# Patient Record
Sex: FEMALE | Race: WHITE | NOT HISPANIC OR LATINO
[De-identification: names, ages, dates, MRNs, and addresses within clinical notes are randomized per-mention and may not be internally consistent; named-entity substitution may affect disease eponyms.]

---

## 2017-05-23 ENCOUNTER — FORM ENCOUNTER (OUTPATIENT)
Age: 52
End: 2017-05-23

## 2017-05-24 ENCOUNTER — APPOINTMENT (OUTPATIENT)
Dept: SPINE | Facility: CLINIC | Age: 52
End: 2017-05-24

## 2017-05-24 ENCOUNTER — OUTPATIENT (OUTPATIENT)
Dept: OUTPATIENT SERVICES | Facility: HOSPITAL | Age: 52
LOS: 1 days | End: 2017-05-24
Payer: COMMERCIAL

## 2017-05-24 VITALS
HEIGHT: 69 IN | DIASTOLIC BLOOD PRESSURE: 67 MMHG | SYSTOLIC BLOOD PRESSURE: 105 MMHG | WEIGHT: 160 LBS | HEART RATE: 62 BPM | BODY MASS INDEX: 23.7 KG/M2 | OXYGEN SATURATION: 99 %

## 2017-05-24 PROCEDURE — 72110 X-RAY EXAM L-2 SPINE 4/>VWS: CPT

## 2017-05-24 PROCEDURE — 72082 X-RAY EXAM ENTIRE SPI 2/3 VW: CPT | Mod: 26

## 2017-05-24 PROCEDURE — 72082 X-RAY EXAM ENTIRE SPI 2/3 VW: CPT

## 2017-07-11 ENCOUNTER — FORM ENCOUNTER (OUTPATIENT)
Age: 52
End: 2017-07-11

## 2017-07-12 ENCOUNTER — OUTPATIENT (OUTPATIENT)
Dept: OUTPATIENT SERVICES | Facility: HOSPITAL | Age: 52
LOS: 1 days | End: 2017-07-12
Payer: COMMERCIAL

## 2017-07-12 ENCOUNTER — APPOINTMENT (OUTPATIENT)
Dept: SPINE | Facility: CLINIC | Age: 52
End: 2017-07-12

## 2017-07-12 VITALS
HEIGHT: 69 IN | BODY MASS INDEX: 23.7 KG/M2 | HEART RATE: 64 BPM | SYSTOLIC BLOOD PRESSURE: 100 MMHG | WEIGHT: 160 LBS | OXYGEN SATURATION: 97 % | DIASTOLIC BLOOD PRESSURE: 60 MMHG

## 2017-07-12 PROCEDURE — 72131 CT LUMBAR SPINE W/O DYE: CPT

## 2017-07-12 PROCEDURE — 72131 CT LUMBAR SPINE W/O DYE: CPT | Mod: 26

## 2017-07-24 ENCOUNTER — RX RENEWAL (OUTPATIENT)
Age: 52
End: 2017-07-24

## 2017-07-24 ENCOUNTER — APPOINTMENT (OUTPATIENT)
Dept: SPINE | Facility: CLINIC | Age: 52
End: 2017-07-24

## 2017-07-24 VITALS
HEART RATE: 72 BPM | DIASTOLIC BLOOD PRESSURE: 64 MMHG | HEIGHT: 69 IN | SYSTOLIC BLOOD PRESSURE: 117 MMHG | BODY MASS INDEX: 23.7 KG/M2 | WEIGHT: 160 LBS

## 2017-07-24 RX ORDER — CHLORHEXIDINE GLUCONATE, 0.12% ORAL RINSE 1.2 MG/ML
0.12 SOLUTION DENTAL
Qty: 473 | Refills: 0 | Status: DISCONTINUED | COMMUNITY
Start: 2017-02-16 | End: 2017-07-24

## 2017-08-15 ENCOUNTER — OUTPATIENT (OUTPATIENT)
Dept: OUTPATIENT SERVICES | Facility: HOSPITAL | Age: 52
LOS: 1 days | End: 2017-08-15
Payer: COMMERCIAL

## 2017-08-15 VITALS
RESPIRATION RATE: 16 BRPM | DIASTOLIC BLOOD PRESSURE: 66 MMHG | WEIGHT: 167.99 LBS | OXYGEN SATURATION: 100 % | HEIGHT: 69 IN | HEART RATE: 68 BPM | SYSTOLIC BLOOD PRESSURE: 106 MMHG | TEMPERATURE: 98 F

## 2017-08-15 DIAGNOSIS — Z01.818 ENCOUNTER FOR OTHER PREPROCEDURAL EXAMINATION: ICD-10-CM

## 2017-08-15 DIAGNOSIS — M51.26 OTHER INTERVERTEBRAL DISC DISPLACEMENT, LUMBAR REGION: ICD-10-CM

## 2017-08-15 LAB
BLD GP AB SCN SERPL QL: NEGATIVE — SIGNIFICANT CHANGE UP
HCT VFR BLD CALC: 41.5 % — SIGNIFICANT CHANGE UP (ref 34.5–45)
HGB BLD-MCNC: 13.6 G/DL — SIGNIFICANT CHANGE UP (ref 11.5–15.5)
MCHC RBC-ENTMCNC: 30.5 PG — SIGNIFICANT CHANGE UP (ref 27–34)
MCHC RBC-ENTMCNC: 32.8 GM/DL — SIGNIFICANT CHANGE UP (ref 32–36)
MCV RBC AUTO: 93 FL — SIGNIFICANT CHANGE UP (ref 80–100)
PLATELET # BLD AUTO: 323 K/UL — SIGNIFICANT CHANGE UP (ref 150–400)
RBC # BLD: 4.46 M/UL — SIGNIFICANT CHANGE UP (ref 3.8–5.2)
RBC # FLD: 13.4 % — SIGNIFICANT CHANGE UP (ref 10.3–14.5)
RH IG SCN BLD-IMP: POSITIVE — SIGNIFICANT CHANGE UP
WBC # BLD: 4.56 K/UL — SIGNIFICANT CHANGE UP (ref 3.8–10.5)
WBC # FLD AUTO: 4.56 K/UL — SIGNIFICANT CHANGE UP (ref 3.8–10.5)

## 2017-08-15 PROCEDURE — 86850 RBC ANTIBODY SCREEN: CPT

## 2017-08-15 PROCEDURE — G0463: CPT

## 2017-08-15 PROCEDURE — 86901 BLOOD TYPING SEROLOGIC RH(D): CPT

## 2017-08-15 PROCEDURE — 85027 COMPLETE CBC AUTOMATED: CPT

## 2017-08-15 PROCEDURE — 86900 BLOOD TYPING SEROLOGIC ABO: CPT

## 2017-08-15 NOTE — H&P PST ADULT - HISTORY OF PRESENT ILLNESS
52 y.o. female c/o worsening low back pain for over 2 years. Her pain radiates down her Right leg to the Right heel with paresthesias. She underwent an MRI and a CT scan of lower back which revealed L5-S1 spondylolisthesis with significant foraminal stenosis. Patient is scheduled for L5-S1 Posterior Lumbar Fusion.

## 2017-08-15 NOTE — H&P PST ADULT - NSANTHOSAYNRD_GEN_A_CORE
No. VITOR screening performed.  STOP BANG Legend: 0-2 = LOW Risk; 3-4 = INTERMEDIATE Risk; 5-8 = HIGH Risk

## 2017-08-15 NOTE — H&P PST ADULT - ACTIVITY
light work around the house, can walk a block on level ground, activities limited by severe back pain

## 2017-10-13 ENCOUNTER — OUTPATIENT (OUTPATIENT)
Dept: OUTPATIENT SERVICES | Facility: HOSPITAL | Age: 52
LOS: 1 days | End: 2017-10-13
Payer: COMMERCIAL

## 2017-10-13 DIAGNOSIS — M54.17 RADICULOPATHY, LUMBOSACRAL REGION: ICD-10-CM

## 2017-10-13 PROCEDURE — 77003 FLUOROGUIDE FOR SPINE INJECT: CPT

## 2017-10-13 PROCEDURE — 64484 NJX AA&/STRD TFRM EPI L/S EA: CPT | Mod: RT

## 2017-10-13 PROCEDURE — 64483 NJX AA&/STRD TFRM EPI L/S 1: CPT | Mod: 50

## 2018-03-15 ENCOUNTER — APPOINTMENT (OUTPATIENT)
Dept: SPINE | Facility: CLINIC | Age: 53
End: 2018-03-15

## 2018-04-19 ENCOUNTER — APPOINTMENT (OUTPATIENT)
Dept: SPINE | Facility: CLINIC | Age: 53
End: 2018-04-19
Payer: COMMERCIAL

## 2018-04-19 VITALS
HEIGHT: 69 IN | SYSTOLIC BLOOD PRESSURE: 100 MMHG | BODY MASS INDEX: 22.22 KG/M2 | DIASTOLIC BLOOD PRESSURE: 60 MMHG | WEIGHT: 150 LBS

## 2018-04-19 DIAGNOSIS — Z00.00 ENCOUNTER FOR GENERAL ADULT MEDICAL EXAMINATION W/OUT ABNORMAL FINDINGS: ICD-10-CM

## 2018-04-19 PROCEDURE — 99213 OFFICE O/P EST LOW 20 MIN: CPT

## 2018-04-19 RX ORDER — TRAMADOL HYDROCHLORIDE 50 MG/1
50 TABLET, COATED ORAL EVERY 8 HOURS
Qty: 42 | Refills: 0 | Status: DISCONTINUED | COMMUNITY
Start: 2017-07-24 | End: 2018-04-19

## 2018-04-19 RX ORDER — IBUPROFEN 800 MG/1
800 TABLET, FILM COATED ORAL
Refills: 0 | Status: DISCONTINUED | COMMUNITY
End: 2018-04-19

## 2018-04-19 RX ORDER — METHYLPREDNISOLONE 4 MG/1
4 TABLET ORAL
Qty: 21 | Refills: 0 | Status: DISCONTINUED | COMMUNITY
Start: 2017-04-18 | End: 2018-04-19

## 2018-04-19 RX ORDER — TRAMADOL HYDROCHLORIDE 50 MG/1
50 TABLET, COATED ORAL
Qty: 42 | Refills: 0 | Status: DISCONTINUED | COMMUNITY
Start: 2017-07-24 | End: 2018-04-19

## 2018-04-21 ENCOUNTER — APPOINTMENT (OUTPATIENT)
Dept: MRI IMAGING | Facility: CLINIC | Age: 53
End: 2018-04-21

## 2018-05-29 ENCOUNTER — APPOINTMENT (OUTPATIENT)
Dept: MRI IMAGING | Facility: CLINIC | Age: 53
End: 2018-05-29
Payer: COMMERCIAL

## 2018-05-29 ENCOUNTER — APPOINTMENT (OUTPATIENT)
Dept: CT IMAGING | Facility: CLINIC | Age: 53
End: 2018-05-29
Payer: COMMERCIAL

## 2018-05-29 ENCOUNTER — APPOINTMENT (OUTPATIENT)
Dept: RADIOLOGY | Facility: CLINIC | Age: 53
End: 2018-05-29
Payer: COMMERCIAL

## 2018-05-29 ENCOUNTER — OUTPATIENT (OUTPATIENT)
Dept: OUTPATIENT SERVICES | Facility: HOSPITAL | Age: 53
LOS: 1 days | End: 2018-05-29
Payer: COMMERCIAL

## 2018-05-29 DIAGNOSIS — M51.26 OTHER INTERVERTEBRAL DISC DISPLACEMENT, LUMBAR REGION: ICD-10-CM

## 2018-05-29 DIAGNOSIS — M54.16 RADICULOPATHY, LUMBAR REGION: ICD-10-CM

## 2018-05-29 PROCEDURE — 72148 MRI LUMBAR SPINE W/O DYE: CPT | Mod: 26

## 2018-05-29 PROCEDURE — 72131 CT LUMBAR SPINE W/O DYE: CPT | Mod: 26

## 2018-05-29 PROCEDURE — 72148 MRI LUMBAR SPINE W/O DYE: CPT

## 2018-05-29 PROCEDURE — 72131 CT LUMBAR SPINE W/O DYE: CPT

## 2018-06-27 ENCOUNTER — OUTPATIENT (OUTPATIENT)
Dept: OUTPATIENT SERVICES | Facility: HOSPITAL | Age: 53
LOS: 1 days | End: 2018-06-27
Payer: COMMERCIAL

## 2018-06-27 VITALS
HEART RATE: 69 BPM | WEIGHT: 166.01 LBS | OXYGEN SATURATION: 100 % | DIASTOLIC BLOOD PRESSURE: 79 MMHG | RESPIRATION RATE: 18 BRPM | SYSTOLIC BLOOD PRESSURE: 118 MMHG | HEIGHT: 69 IN | TEMPERATURE: 98 F

## 2018-06-27 DIAGNOSIS — Z01.818 ENCOUNTER FOR OTHER PREPROCEDURAL EXAMINATION: ICD-10-CM

## 2018-06-27 DIAGNOSIS — M51.26 OTHER INTERVERTEBRAL DISC DISPLACEMENT, LUMBAR REGION: ICD-10-CM

## 2018-06-27 DIAGNOSIS — Z90.49 ACQUIRED ABSENCE OF OTHER SPECIFIED PARTS OF DIGESTIVE TRACT: Chronic | ICD-10-CM

## 2018-06-27 LAB
ALBUMIN SERPL ELPH-MCNC: 4.3 G/DL — SIGNIFICANT CHANGE UP (ref 3.3–5)
ALP SERPL-CCNC: 70 U/L — SIGNIFICANT CHANGE UP (ref 40–120)
ALT FLD-CCNC: 14 U/L — SIGNIFICANT CHANGE UP (ref 10–45)
ANION GAP SERPL CALC-SCNC: 11 MMOL/L — SIGNIFICANT CHANGE UP (ref 5–17)
AST SERPL-CCNC: 14 U/L — SIGNIFICANT CHANGE UP (ref 10–40)
BILIRUB SERPL-MCNC: 0.2 MG/DL — SIGNIFICANT CHANGE UP (ref 0.2–1.2)
BLD GP AB SCN SERPL QL: NEGATIVE — SIGNIFICANT CHANGE UP
BUN SERPL-MCNC: 12 MG/DL — SIGNIFICANT CHANGE UP (ref 7–23)
CALCIUM SERPL-MCNC: 9.5 MG/DL — SIGNIFICANT CHANGE UP (ref 8.4–10.5)
CHLORIDE SERPL-SCNC: 106 MMOL/L — SIGNIFICANT CHANGE UP (ref 96–108)
CO2 SERPL-SCNC: 25 MMOL/L — SIGNIFICANT CHANGE UP (ref 22–31)
CREAT SERPL-MCNC: 0.65 MG/DL — SIGNIFICANT CHANGE UP (ref 0.5–1.3)
GLUCOSE SERPL-MCNC: 74 MG/DL — SIGNIFICANT CHANGE UP (ref 70–99)
HCT VFR BLD CALC: 42 % — SIGNIFICANT CHANGE UP (ref 34.5–45)
HGB BLD-MCNC: 13.8 G/DL — SIGNIFICANT CHANGE UP (ref 11.5–15.5)
MCHC RBC-ENTMCNC: 30.3 PG — SIGNIFICANT CHANGE UP (ref 27–34)
MCHC RBC-ENTMCNC: 32.9 GM/DL — SIGNIFICANT CHANGE UP (ref 32–36)
MCV RBC AUTO: 92.1 FL — SIGNIFICANT CHANGE UP (ref 80–100)
MRSA PCR RESULT.: SIGNIFICANT CHANGE UP
PLATELET # BLD AUTO: 306 K/UL — SIGNIFICANT CHANGE UP (ref 150–400)
POTASSIUM SERPL-MCNC: 4.3 MMOL/L — SIGNIFICANT CHANGE UP (ref 3.5–5.3)
POTASSIUM SERPL-SCNC: 4.3 MMOL/L — SIGNIFICANT CHANGE UP (ref 3.5–5.3)
PROT SERPL-MCNC: 7.1 G/DL — SIGNIFICANT CHANGE UP (ref 6–8.3)
RBC # BLD: 4.56 M/UL — SIGNIFICANT CHANGE UP (ref 3.8–5.2)
RBC # FLD: 14.7 % — HIGH (ref 10.3–14.5)
RH IG SCN BLD-IMP: POSITIVE — SIGNIFICANT CHANGE UP
S AUREUS DNA NOSE QL NAA+PROBE: SIGNIFICANT CHANGE UP
SODIUM SERPL-SCNC: 142 MMOL/L — SIGNIFICANT CHANGE UP (ref 135–145)
WBC # BLD: 4.98 K/UL — SIGNIFICANT CHANGE UP (ref 3.8–10.5)
WBC # FLD AUTO: 4.98 K/UL — SIGNIFICANT CHANGE UP (ref 3.8–10.5)

## 2018-06-27 PROCEDURE — 86900 BLOOD TYPING SEROLOGIC ABO: CPT

## 2018-06-27 PROCEDURE — 85027 COMPLETE CBC AUTOMATED: CPT

## 2018-06-27 PROCEDURE — 80053 COMPREHEN METABOLIC PANEL: CPT

## 2018-06-27 PROCEDURE — 86901 BLOOD TYPING SEROLOGIC RH(D): CPT

## 2018-06-27 PROCEDURE — 86850 RBC ANTIBODY SCREEN: CPT

## 2018-06-27 PROCEDURE — 87641 MR-STAPH DNA AMP PROBE: CPT

## 2018-06-27 PROCEDURE — 87640 STAPH A DNA AMP PROBE: CPT

## 2018-06-27 PROCEDURE — G0463: CPT

## 2018-06-27 RX ORDER — IBUPROFEN 200 MG
1 TABLET ORAL
Qty: 0 | Refills: 0 | COMMUNITY

## 2018-06-27 RX ORDER — CEFAZOLIN SODIUM 1 G
2000 VIAL (EA) INJECTION ONCE
Qty: 0 | Refills: 0 | Status: DISCONTINUED | OUTPATIENT
Start: 2018-07-06 | End: 2018-07-07

## 2018-06-27 RX ORDER — LIDOCAINE HCL 20 MG/ML
0.2 VIAL (ML) INJECTION ONCE
Qty: 0 | Refills: 0 | Status: DISCONTINUED | OUTPATIENT
Start: 2018-07-06 | End: 2018-07-06

## 2018-06-27 RX ORDER — SODIUM CHLORIDE 9 MG/ML
3 INJECTION INTRAMUSCULAR; INTRAVENOUS; SUBCUTANEOUS EVERY 8 HOURS
Qty: 0 | Refills: 0 | Status: DISCONTINUED | OUTPATIENT
Start: 2018-07-06 | End: 2018-07-06

## 2018-06-27 NOTE — H&P PST ADULT - PMH
Appendicitis    Herniated lumbar intervertebral disc    Ileus following gastrointestinal surgery    Lumbar radiculopathy    Pelvic abscess in female

## 2018-06-27 NOTE — H&P PST ADULT - HISTORY OF PRESENT ILLNESS
54 y/o F PMH appendicitis, S/P appendectomy 4/2018 at [facility], c/b E Coli+_pelvic abscess that was drained and ileus post-op with NGT placement, was admitted for 20 days, presents today with c/o low back pain for several years that radiates into the right lower leg, S/P epidural injections, last one a year ago without relief.  Scheduled for L5-S12 Metrx posterior lumbar fusion. 54 y/o F PMH appendicitis, S/P appendectomy 4/2018 at [facility], c/b E Coli+ pelvic abscess that was drained and ileus post-op with NGT placement, was admitted for 20 days, presents today with c/o low back pain for several years that radiates into the right lower leg, S/P epidural injections, last one a year ago without relief.  Scheduled for L5-S12 Metrx posterior lumbar fusion.

## 2018-06-27 NOTE — H&P PST ADULT - ATTENDING COMMENTS
agree with note agree with note  Pt. had allergy testing  and is allergic to copper and cobalt.   She is not allergic to titanium which will be utilized in her fusion today. agree with note  Pt. had allergy testing for metals and is allergic to copper and cobalt among other things(see allergy section).   She is not allergic to titanium which will be utilized in her fusion today.

## 2018-07-02 ENCOUNTER — OUTPATIENT (OUTPATIENT)
Dept: OUTPATIENT SERVICES | Facility: HOSPITAL | Age: 53
LOS: 1 days | End: 2018-07-02
Payer: COMMERCIAL

## 2018-07-02 ENCOUNTER — APPOINTMENT (OUTPATIENT)
Dept: RADIOLOGY | Facility: CLINIC | Age: 53
End: 2018-07-02
Payer: COMMERCIAL

## 2018-07-02 DIAGNOSIS — Z90.49 ACQUIRED ABSENCE OF OTHER SPECIFIED PARTS OF DIGESTIVE TRACT: Chronic | ICD-10-CM

## 2018-07-02 DIAGNOSIS — M51.26 OTHER INTERVERTEBRAL DISC DISPLACEMENT, LUMBAR REGION: ICD-10-CM

## 2018-07-02 PROCEDURE — 72082 X-RAY EXAM ENTIRE SPI 2/3 VW: CPT | Mod: 26

## 2018-07-02 PROCEDURE — 72082 X-RAY EXAM ENTIRE SPI 2/3 VW: CPT

## 2018-07-05 ENCOUNTER — TRANSCRIPTION ENCOUNTER (OUTPATIENT)
Age: 53
End: 2018-07-05

## 2018-07-05 ENCOUNTER — OTHER (OUTPATIENT)
Age: 53
End: 2018-07-05

## 2018-07-06 ENCOUNTER — INPATIENT (INPATIENT)
Facility: HOSPITAL | Age: 53
LOS: 3 days | Discharge: HOME | DRG: 460 | End: 2018-07-10
Attending: NEUROLOGICAL SURGERY | Admitting: NEUROLOGICAL SURGERY
Payer: COMMERCIAL

## 2018-07-06 ENCOUNTER — APPOINTMENT (OUTPATIENT)
Dept: SPINE | Facility: HOSPITAL | Age: 53
End: 2018-07-06

## 2018-07-06 VITALS
OXYGEN SATURATION: 100 % | SYSTOLIC BLOOD PRESSURE: 106 MMHG | DIASTOLIC BLOOD PRESSURE: 73 MMHG | RESPIRATION RATE: 18 BRPM | HEART RATE: 69 BPM | HEIGHT: 69 IN | WEIGHT: 166.01 LBS | TEMPERATURE: 98 F

## 2018-07-06 DIAGNOSIS — Z90.49 ACQUIRED ABSENCE OF OTHER SPECIFIED PARTS OF DIGESTIVE TRACT: Chronic | ICD-10-CM

## 2018-07-06 DIAGNOSIS — Z01.818 ENCOUNTER FOR OTHER PREPROCEDURAL EXAMINATION: ICD-10-CM

## 2018-07-06 DIAGNOSIS — M51.26 OTHER INTERVERTEBRAL DISC DISPLACEMENT, LUMBAR REGION: ICD-10-CM

## 2018-07-06 LAB
ALBUMIN SERPL ELPH-MCNC: 4.2 G/DL — SIGNIFICANT CHANGE UP (ref 3.3–5)
ALP SERPL-CCNC: 66 U/L — SIGNIFICANT CHANGE UP (ref 40–120)
ALT FLD-CCNC: 14 U/L — SIGNIFICANT CHANGE UP (ref 10–45)
ANION GAP SERPL CALC-SCNC: 14 MMOL/L — SIGNIFICANT CHANGE UP (ref 5–17)
APPEARANCE UR: ABNORMAL
AST SERPL-CCNC: 17 U/L — SIGNIFICANT CHANGE UP (ref 10–40)
BASOPHILS # BLD AUTO: 0 K/UL — SIGNIFICANT CHANGE UP (ref 0–0.2)
BASOPHILS NFR BLD AUTO: 0.4 % — SIGNIFICANT CHANGE UP (ref 0–2)
BILIRUB SERPL-MCNC: 0.1 MG/DL — LOW (ref 0.2–1.2)
BILIRUB UR-MCNC: NEGATIVE — SIGNIFICANT CHANGE UP
BUN SERPL-MCNC: 13 MG/DL — SIGNIFICANT CHANGE UP (ref 7–23)
CALCIUM SERPL-MCNC: 8.7 MG/DL — SIGNIFICANT CHANGE UP (ref 8.4–10.5)
CHLORIDE SERPL-SCNC: 105 MMOL/L — SIGNIFICANT CHANGE UP (ref 96–108)
CO2 SERPL-SCNC: 23 MMOL/L — SIGNIFICANT CHANGE UP (ref 22–31)
COLOR SPEC: YELLOW — SIGNIFICANT CHANGE UP
CREAT SERPL-MCNC: 0.67 MG/DL — SIGNIFICANT CHANGE UP (ref 0.5–1.3)
DIFF PNL FLD: ABNORMAL
EOSINOPHIL # BLD AUTO: 0 K/UL — SIGNIFICANT CHANGE UP (ref 0–0.5)
EOSINOPHIL NFR BLD AUTO: 0.6 % — SIGNIFICANT CHANGE UP (ref 0–6)
GLUCOSE BLDC GLUCOMTR-MCNC: 88 MG/DL — SIGNIFICANT CHANGE UP (ref 70–99)
GLUCOSE SERPL-MCNC: 157 MG/DL — HIGH (ref 70–99)
GLUCOSE UR QL: NEGATIVE — SIGNIFICANT CHANGE UP
HCG UR QL: NEGATIVE — SIGNIFICANT CHANGE UP
HCT VFR BLD CALC: 39.5 % — SIGNIFICANT CHANGE UP (ref 34.5–45)
HGB BLD-MCNC: 13.8 G/DL — SIGNIFICANT CHANGE UP (ref 11.5–15.5)
KETONES UR-MCNC: NEGATIVE — SIGNIFICANT CHANGE UP
LEUKOCYTE ESTERASE UR-ACNC: NEGATIVE — SIGNIFICANT CHANGE UP
LYMPHOCYTES # BLD AUTO: 0.6 K/UL — LOW (ref 1–3.3)
LYMPHOCYTES # BLD AUTO: 8.9 % — LOW (ref 13–44)
MCHC RBC-ENTMCNC: 33 PG — SIGNIFICANT CHANGE UP (ref 27–34)
MCHC RBC-ENTMCNC: 34.9 GM/DL — SIGNIFICANT CHANGE UP (ref 32–36)
MCV RBC AUTO: 94.4 FL — SIGNIFICANT CHANGE UP (ref 80–100)
MONOCYTES # BLD AUTO: 0.2 K/UL — SIGNIFICANT CHANGE UP (ref 0–0.9)
MONOCYTES NFR BLD AUTO: 2.5 % — SIGNIFICANT CHANGE UP (ref 2–14)
NEUTROPHILS # BLD AUTO: 5.8 K/UL — SIGNIFICANT CHANGE UP (ref 1.8–7.4)
NEUTROPHILS NFR BLD AUTO: 87.6 % — HIGH (ref 43–77)
NITRITE UR-MCNC: NEGATIVE — SIGNIFICANT CHANGE UP
PH UR: 5.5 — SIGNIFICANT CHANGE UP (ref 5–8)
PLATELET # BLD AUTO: 269 K/UL — SIGNIFICANT CHANGE UP (ref 150–400)
POTASSIUM SERPL-MCNC: 4.2 MMOL/L — SIGNIFICANT CHANGE UP (ref 3.5–5.3)
POTASSIUM SERPL-SCNC: 4.2 MMOL/L — SIGNIFICANT CHANGE UP (ref 3.5–5.3)
PROT SERPL-MCNC: 6.8 G/DL — SIGNIFICANT CHANGE UP (ref 6–8.3)
PROT UR-MCNC: SIGNIFICANT CHANGE UP
RBC # BLD: 4.19 M/UL — SIGNIFICANT CHANGE UP (ref 3.8–5.2)
RBC # FLD: 13.8 % — SIGNIFICANT CHANGE UP (ref 10.3–14.5)
SODIUM SERPL-SCNC: 142 MMOL/L — SIGNIFICANT CHANGE UP (ref 135–145)
SP GR SPEC: >1.03 — HIGH (ref 1.01–1.02)
UROBILINOGEN FLD QL: NEGATIVE — SIGNIFICANT CHANGE UP
WBC # BLD: 6.6 K/UL — SIGNIFICANT CHANGE UP (ref 3.8–10.5)
WBC # FLD AUTO: 6.6 K/UL — SIGNIFICANT CHANGE UP (ref 3.8–10.5)

## 2018-07-06 RX ORDER — PANTOPRAZOLE SODIUM 20 MG/1
40 TABLET, DELAYED RELEASE ORAL DAILY
Qty: 0 | Refills: 0 | Status: DISCONTINUED | OUTPATIENT
Start: 2018-07-06 | End: 2018-07-07

## 2018-07-06 RX ORDER — HYDROMORPHONE HYDROCHLORIDE 2 MG/ML
0.5 INJECTION INTRAMUSCULAR; INTRAVENOUS; SUBCUTANEOUS ONCE
Qty: 0 | Refills: 0 | Status: DISCONTINUED | OUTPATIENT
Start: 2018-07-06 | End: 2018-07-06

## 2018-07-06 RX ORDER — CEFAZOLIN SODIUM 1 G
1000 VIAL (EA) INJECTION EVERY 8 HOURS
Qty: 0 | Refills: 0 | Status: COMPLETED | OUTPATIENT
Start: 2018-07-06 | End: 2018-07-07

## 2018-07-06 RX ORDER — ACETAMINOPHEN 500 MG
650 TABLET ORAL EVERY 6 HOURS
Qty: 0 | Refills: 0 | Status: DISCONTINUED | OUTPATIENT
Start: 2018-07-06 | End: 2018-07-10

## 2018-07-06 RX ORDER — DOCUSATE SODIUM 100 MG
100 CAPSULE ORAL THREE TIMES A DAY
Qty: 0 | Refills: 0 | Status: DISCONTINUED | OUTPATIENT
Start: 2018-07-06 | End: 2018-07-10

## 2018-07-06 RX ORDER — ONDANSETRON 8 MG/1
4 TABLET, FILM COATED ORAL EVERY 6 HOURS
Qty: 0 | Refills: 0 | Status: DISCONTINUED | OUTPATIENT
Start: 2018-07-06 | End: 2018-07-10

## 2018-07-06 RX ORDER — OXYCODONE HYDROCHLORIDE 5 MG/1
10 TABLET ORAL EVERY 4 HOURS
Qty: 0 | Refills: 0 | Status: DISCONTINUED | OUTPATIENT
Start: 2018-07-06 | End: 2018-07-07

## 2018-07-06 RX ORDER — HYDROMORPHONE HYDROCHLORIDE 2 MG/ML
0.25 INJECTION INTRAMUSCULAR; INTRAVENOUS; SUBCUTANEOUS
Qty: 0 | Refills: 0 | Status: DISCONTINUED | OUTPATIENT
Start: 2018-07-06 | End: 2018-07-06

## 2018-07-06 RX ORDER — OXYCODONE HYDROCHLORIDE 5 MG/1
5 TABLET ORAL EVERY 4 HOURS
Qty: 0 | Refills: 0 | Status: DISCONTINUED | OUTPATIENT
Start: 2018-07-06 | End: 2018-07-07

## 2018-07-06 RX ORDER — DEXTROSE MONOHYDRATE, SODIUM CHLORIDE, AND POTASSIUM CHLORIDE 50; .745; 4.5 G/1000ML; G/1000ML; G/1000ML
1000 INJECTION, SOLUTION INTRAVENOUS
Qty: 0 | Refills: 0 | Status: DISCONTINUED | OUTPATIENT
Start: 2018-07-06 | End: 2018-07-07

## 2018-07-06 RX ORDER — SENNA PLUS 8.6 MG/1
2 TABLET ORAL AT BEDTIME
Qty: 0 | Refills: 0 | Status: DISCONTINUED | OUTPATIENT
Start: 2018-07-06 | End: 2018-07-09

## 2018-07-06 RX ADMIN — OXYCODONE HYDROCHLORIDE 10 MILLIGRAM(S): 5 TABLET ORAL at 22:10

## 2018-07-06 RX ADMIN — HYDROMORPHONE HYDROCHLORIDE 0.25 MILLIGRAM(S): 2 INJECTION INTRAMUSCULAR; INTRAVENOUS; SUBCUTANEOUS at 15:22

## 2018-07-06 RX ADMIN — SENNA PLUS 2 TABLET(S): 8.6 TABLET ORAL at 22:10

## 2018-07-06 RX ADMIN — DEXTROSE MONOHYDRATE, SODIUM CHLORIDE, AND POTASSIUM CHLORIDE 75 MILLILITER(S): 50; .745; 4.5 INJECTION, SOLUTION INTRAVENOUS at 14:41

## 2018-07-06 RX ADMIN — Medication 100 MILLIGRAM(S): at 16:56

## 2018-07-06 RX ADMIN — Medication 100 MILLIGRAM(S): at 22:10

## 2018-07-06 RX ADMIN — HYDROMORPHONE HYDROCHLORIDE 0.25 MILLIGRAM(S): 2 INJECTION INTRAMUSCULAR; INTRAVENOUS; SUBCUTANEOUS at 12:46

## 2018-07-06 RX ADMIN — OXYCODONE HYDROCHLORIDE 5 MILLIGRAM(S): 5 TABLET ORAL at 19:14

## 2018-07-06 RX ADMIN — OXYCODONE HYDROCHLORIDE 5 MILLIGRAM(S): 5 TABLET ORAL at 18:24

## 2018-07-06 RX ADMIN — HYDROMORPHONE HYDROCHLORIDE 0.25 MILLIGRAM(S): 2 INJECTION INTRAMUSCULAR; INTRAVENOUS; SUBCUTANEOUS at 13:00

## 2018-07-06 RX ADMIN — Medication 100 MILLIGRAM(S): at 22:11

## 2018-07-06 RX ADMIN — SODIUM CHLORIDE 3 MILLILITER(S): 9 INJECTION INTRAMUSCULAR; INTRAVENOUS; SUBCUTANEOUS at 07:08

## 2018-07-06 RX ADMIN — HYDROMORPHONE HYDROCHLORIDE 0.5 MILLIGRAM(S): 2 INJECTION INTRAMUSCULAR; INTRAVENOUS; SUBCUTANEOUS at 13:00

## 2018-07-06 RX ADMIN — HYDROMORPHONE HYDROCHLORIDE 0.5 MILLIGRAM(S): 2 INJECTION INTRAMUSCULAR; INTRAVENOUS; SUBCUTANEOUS at 13:29

## 2018-07-06 RX ADMIN — OXYCODONE HYDROCHLORIDE 10 MILLIGRAM(S): 5 TABLET ORAL at 23:10

## 2018-07-06 NOTE — PHYSICAL THERAPY INITIAL EVALUATION ADULT - PERTINENT HX OF CURRENT PROBLEM, REHAB EVAL
52 y/o F PMH appendicitis, S/P appendectomy 4/2018 at [facility], c/b E Coli+ pelvic abscess that was drained and ileus post-op with NGT placement, was admitted for 20 days, presents today with c/o low back pain for several years that radiates into the right lower leg, S/P epidural injections, last one a year ago without relief.  Now s/p L5-S1 Metrx posterior lumbar fusion.

## 2018-07-06 NOTE — PHYSICAL THERAPY INITIAL EVALUATION ADULT - GENERAL OBSERVATIONS, REHAB EVAL
Pt rec'd supine in bed in NAD, VSS, +significant other at bedside, +dressing to low back C/D/I, agreeable to PT

## 2018-07-06 NOTE — PHYSICAL THERAPY INITIAL EVALUATION ADULT - ADDITIONAL COMMENTS
Pt lives in an apartment building with her significant other and sons. +elevator access. Reports she was independent with all ADLs prior and was ambulating independently without use of an AD.

## 2018-07-06 NOTE — PHYSICAL THERAPY INITIAL EVALUATION ADULT - ACTIVE RANGE OF MOTION EXAMINATION, REHAB EVAL
Right UE Active ROM was WFL (within functional limits)/Right LE Active ROM was WFL (within functional limits)/Left LE Active ROM was WFL (within functional limits)/Left UE Active ROM was WFL (within functional limits)

## 2018-07-07 ENCOUNTER — TRANSCRIPTION ENCOUNTER (OUTPATIENT)
Age: 53
End: 2018-07-07

## 2018-07-07 LAB
ANION GAP SERPL CALC-SCNC: 10 MMOL/L — SIGNIFICANT CHANGE UP (ref 5–17)
BUN SERPL-MCNC: 9 MG/DL — SIGNIFICANT CHANGE UP (ref 7–23)
CALCIUM SERPL-MCNC: 8.4 MG/DL — SIGNIFICANT CHANGE UP (ref 8.4–10.5)
CHLORIDE SERPL-SCNC: 106 MMOL/L — SIGNIFICANT CHANGE UP (ref 96–108)
CO2 SERPL-SCNC: 24 MMOL/L — SIGNIFICANT CHANGE UP (ref 22–31)
CREAT SERPL-MCNC: 0.6 MG/DL — SIGNIFICANT CHANGE UP (ref 0.5–1.3)
GLUCOSE SERPL-MCNC: 98 MG/DL — SIGNIFICANT CHANGE UP (ref 70–99)
HCT VFR BLD CALC: 39.1 % — SIGNIFICANT CHANGE UP (ref 34.5–45)
HGB BLD-MCNC: 12.6 G/DL — SIGNIFICANT CHANGE UP (ref 11.5–15.5)
MCHC RBC-ENTMCNC: 30.5 PG — SIGNIFICANT CHANGE UP (ref 27–34)
MCHC RBC-ENTMCNC: 32.3 GM/DL — SIGNIFICANT CHANGE UP (ref 32–36)
MCV RBC AUTO: 94.2 FL — SIGNIFICANT CHANGE UP (ref 80–100)
PLATELET # BLD AUTO: 226 K/UL — SIGNIFICANT CHANGE UP (ref 150–400)
POTASSIUM SERPL-MCNC: 4.5 MMOL/L — SIGNIFICANT CHANGE UP (ref 3.5–5.3)
POTASSIUM SERPL-SCNC: 4.5 MMOL/L — SIGNIFICANT CHANGE UP (ref 3.5–5.3)
RBC # BLD: 4.15 M/UL — SIGNIFICANT CHANGE UP (ref 3.8–5.2)
RBC # FLD: 13.8 % — SIGNIFICANT CHANGE UP (ref 10.3–14.5)
SODIUM SERPL-SCNC: 140 MMOL/L — SIGNIFICANT CHANGE UP (ref 135–145)
WBC # BLD: 8.8 K/UL — SIGNIFICANT CHANGE UP (ref 3.8–10.5)
WBC # FLD AUTO: 8.8 K/UL — SIGNIFICANT CHANGE UP (ref 3.8–10.5)

## 2018-07-07 PROCEDURE — 72131 CT LUMBAR SPINE W/O DYE: CPT | Mod: 26

## 2018-07-07 RX ORDER — SODIUM CHLORIDE 9 MG/ML
1000 INJECTION INTRAMUSCULAR; INTRAVENOUS; SUBCUTANEOUS
Qty: 0 | Refills: 0 | Status: DISCONTINUED | OUTPATIENT
Start: 2018-07-07 | End: 2018-07-09

## 2018-07-07 RX ORDER — HYDROMORPHONE HYDROCHLORIDE 2 MG/ML
0.5 INJECTION INTRAMUSCULAR; INTRAVENOUS; SUBCUTANEOUS ONCE
Qty: 0 | Refills: 0 | Status: DISCONTINUED | OUTPATIENT
Start: 2018-07-07 | End: 2018-07-07

## 2018-07-07 RX ORDER — HYDROMORPHONE HYDROCHLORIDE 2 MG/ML
0.5 INJECTION INTRAMUSCULAR; INTRAVENOUS; SUBCUTANEOUS EVERY 4 HOURS
Qty: 0 | Refills: 0 | Status: DISCONTINUED | OUTPATIENT
Start: 2018-07-07 | End: 2018-07-10

## 2018-07-07 RX ORDER — OXYCODONE AND ACETAMINOPHEN 5; 325 MG/1; MG/1
2 TABLET ORAL EVERY 4 HOURS
Qty: 0 | Refills: 0 | Status: DISCONTINUED | OUTPATIENT
Start: 2018-07-07 | End: 2018-07-10

## 2018-07-07 RX ORDER — ACETAMINOPHEN 500 MG
1000 TABLET ORAL ONCE
Qty: 0 | Refills: 0 | Status: COMPLETED | OUTPATIENT
Start: 2018-07-07 | End: 2018-07-07

## 2018-07-07 RX ORDER — ACETAMINOPHEN 500 MG
2 TABLET ORAL
Qty: 0 | Refills: 0 | DISCHARGE
Start: 2018-07-07

## 2018-07-07 RX ORDER — SENNA PLUS 8.6 MG/1
2 TABLET ORAL
Qty: 0 | Refills: 0 | COMMUNITY
Start: 2018-07-07

## 2018-07-07 RX ORDER — OXYCODONE AND ACETAMINOPHEN 5; 325 MG/1; MG/1
1 TABLET ORAL EVERY 4 HOURS
Qty: 0 | Refills: 0 | Status: DISCONTINUED | OUTPATIENT
Start: 2018-07-07 | End: 2018-07-10

## 2018-07-07 RX ORDER — SODIUM CHLORIDE 9 MG/ML
1000 INJECTION INTRAMUSCULAR; INTRAVENOUS; SUBCUTANEOUS ONCE
Qty: 0 | Refills: 0 | Status: COMPLETED | OUTPATIENT
Start: 2018-07-07 | End: 2018-07-07

## 2018-07-07 RX ORDER — DOCUSATE SODIUM 100 MG
1 CAPSULE ORAL
Qty: 0 | Refills: 0 | DISCHARGE
Start: 2018-07-07

## 2018-07-07 RX ORDER — SODIUM CHLORIDE 9 MG/ML
1000 INJECTION INTRAMUSCULAR; INTRAVENOUS; SUBCUTANEOUS
Qty: 0 | Refills: 0 | Status: DISCONTINUED | OUTPATIENT
Start: 2018-07-07 | End: 2018-07-07

## 2018-07-07 RX ORDER — ENOXAPARIN SODIUM 100 MG/ML
40 INJECTION SUBCUTANEOUS
Qty: 0 | Refills: 0 | Status: DISCONTINUED | OUTPATIENT
Start: 2018-07-07 | End: 2018-07-10

## 2018-07-07 RX ADMIN — Medication 100 MILLIGRAM(S): at 06:26

## 2018-07-07 RX ADMIN — Medication 100 MILLIGRAM(S): at 06:25

## 2018-07-07 RX ADMIN — PANTOPRAZOLE SODIUM 40 MILLIGRAM(S): 20 TABLET, DELAYED RELEASE ORAL at 12:01

## 2018-07-07 RX ADMIN — ENOXAPARIN SODIUM 40 MILLIGRAM(S): 100 INJECTION SUBCUTANEOUS at 17:21

## 2018-07-07 RX ADMIN — OXYCODONE HYDROCHLORIDE 10 MILLIGRAM(S): 5 TABLET ORAL at 09:01

## 2018-07-07 RX ADMIN — HYDROMORPHONE HYDROCHLORIDE 0.5 MILLIGRAM(S): 2 INJECTION INTRAMUSCULAR; INTRAVENOUS; SUBCUTANEOUS at 15:20

## 2018-07-07 RX ADMIN — OXYCODONE HYDROCHLORIDE 10 MILLIGRAM(S): 5 TABLET ORAL at 04:42

## 2018-07-07 RX ADMIN — HYDROMORPHONE HYDROCHLORIDE 0.5 MILLIGRAM(S): 2 INJECTION INTRAMUSCULAR; INTRAVENOUS; SUBCUTANEOUS at 17:19

## 2018-07-07 RX ADMIN — OXYCODONE HYDROCHLORIDE 10 MILLIGRAM(S): 5 TABLET ORAL at 12:22

## 2018-07-07 RX ADMIN — OXYCODONE HYDROCHLORIDE 10 MILLIGRAM(S): 5 TABLET ORAL at 05:52

## 2018-07-07 RX ADMIN — OXYCODONE AND ACETAMINOPHEN 2 TABLET(S): 5; 325 TABLET ORAL at 22:31

## 2018-07-07 RX ADMIN — OXYCODONE AND ACETAMINOPHEN 2 TABLET(S): 5; 325 TABLET ORAL at 17:20

## 2018-07-07 RX ADMIN — HYDROMORPHONE HYDROCHLORIDE 0.5 MILLIGRAM(S): 2 INJECTION INTRAMUSCULAR; INTRAVENOUS; SUBCUTANEOUS at 17:44

## 2018-07-07 RX ADMIN — OXYCODONE AND ACETAMINOPHEN 2 TABLET(S): 5; 325 TABLET ORAL at 16:51

## 2018-07-07 RX ADMIN — Medication 100 MILLIGRAM(S): at 12:01

## 2018-07-07 RX ADMIN — Medication 400 MILLIGRAM(S): at 12:44

## 2018-07-07 RX ADMIN — Medication 1000 MILLIGRAM(S): at 12:44

## 2018-07-07 RX ADMIN — Medication 100 MILLIGRAM(S): at 22:31

## 2018-07-07 RX ADMIN — OXYCODONE HYDROCHLORIDE 10 MILLIGRAM(S): 5 TABLET ORAL at 08:31

## 2018-07-07 RX ADMIN — HYDROMORPHONE HYDROCHLORIDE 0.5 MILLIGRAM(S): 2 INJECTION INTRAMUSCULAR; INTRAVENOUS; SUBCUTANEOUS at 15:40

## 2018-07-07 RX ADMIN — SODIUM CHLORIDE 1000 MILLILITER(S): 9 INJECTION INTRAMUSCULAR; INTRAVENOUS; SUBCUTANEOUS at 12:00

## 2018-07-07 RX ADMIN — OXYCODONE HYDROCHLORIDE 10 MILLIGRAM(S): 5 TABLET ORAL at 12:52

## 2018-07-07 NOTE — DISCHARGE NOTE ADULT - HOSPITAL COURSE
54 y/o Female w/PMHx appendicitis, s/p appendectomy 4/2018 at [facility], c/b E Coli+ pelvic abscess that was drained and ileus post-op with NGT placement, was admitted for 20 days. Presented with low back pain for several years that radiates into the right lower leg, S/P epidural injections, last one a year ago without relief. Pt underwent L5-S1 fusion on 7/6/18. Patient was monitored in the recovery room and then transferred to the floor for discharge planning.  Postoperatively patient was found to orthostatic when out of bed, which was managed with fluids.  Postoperatively patient also had CT and Xray of her Lumbar spine which was reviewed by neurosurgery. Patient was seen by physical therapy who recommended patient go home with outpatient PT. 54 y/o Female w/PMHx appendicitis, s/p appendectomy 4/2018 at [facility], c/b E Coli+ pelvic abscess that was drained and ileus post-op with NGT placement, was admitted for 20 days. Presented with low back pain for several years that radiates into the right lower leg, S/P epidural injections, last one a year ago without relief. Pt underwent L5-S1 fusion on 7/6/18. Patient was monitored in the recovery room and then transferred to the floor for discharge planning.  Postoperatively patient was found to be orthostatic when out of bed, which was managed with fluid resuscitation.  Postoperatively patient also had CT and Xray of her Lumbar spine which was reviewed by neurosurgery. Patient was seen by physical therapy who recommended patient go home with outpatient PT.  On the day of discharge, she is medically and neurosurgically stable and cleared for discharge. Patient is a 53 year old female with a past medical history of appendicitis, s/p appendectomy 4/2018 at [facility], c/b E Coli+ pelvic abscess that was drained and ileus post-op with NGT placement, was admitted for 20 days. Presented with low back pain for several years that radiates into the right lower leg, S/P epidural injections, last one a year ago without relief. Patient was admitted through same day admitting on 7/6/18 and she underwent L5-S1 posterior lumbar interbody fusion. on 7/6/18. Post-operative course complicated by orthostatic hypotension when out of bed, which was managed with fluid resuscitation.  Post operative CT lumbar spine revealed expected post operative changes. Post operatively, patient also had a fever of 102.2; fever work up revealed mildly positive urinalysis. Culture grew out >100k enterococcus faecalis. She was evaluated by physical therapy who recommended outpatient PT upon discharge. On the day of discharge, she is medically and neurosurgically stable and cleared for discharge. Patient is a 53 year old female with a past medical history of appendicitis, s/p appendectomy 4/2018 at [facility], c/b E Coli+ pelvic abscess that was drained and ileus post-op with NGT placement, was admitted for 20 days. Presented with low back pain for several years that radiates into the right lower leg, S/P epidural injections, last one a year ago without relief. Patient was admitted through same day admitting on 7/6/18 and she underwent L5-S1 posterior lumbar interbody fusion. on 7/6/18. Post-operative course complicated by orthostatic hypotension when out of bed, which was managed with fluid resuscitation.  Post operative CT lumbar spine revealed expected post operative changes. Post operatively, patient also had a fever of 102.2; fever work up revealed mildly positive urinalysis. Culture grew out >100k enterococcus faecalis; Infectious disease consulted and recommended amoxcillin 500 TID x 3 days. She was evaluated by physical therapy who recommended outpatient PT upon discharge. On the day of discharge, she is medically and neurosurgically stable and cleared for discharge.

## 2018-07-07 NOTE — DISCHARGE NOTE ADULT - CARE PROVIDER_API CALL
Mauricio Kowalski (MD), Neurological Surgery  [address] [place] [address] [address]  [place] [address]  Phone: [phone]  Fax: [phone]

## 2018-07-07 NOTE — DISCHARGE NOTE ADULT - ADDITIONAL INSTRUCTIONS
Please follow up Neurosurgeon Dr. Kowalski in one to two weeks 7/16. Please call office to make appointment.   Staples to be removed by Neurosurgeon on postoperative day #14 (7/20) at follow up visit.   Please follow up with Primary Care Physician. Please call office to make appointment. BP check.    May shower postoperative day number 4 (7/10). please keep incision clean and dry, do not submerge wound in water for prolonged periods of time, pat dry after showering, and do not use any creams or ointments to incision.     Please do not engage in strenuous activity, heavy lifting, drive, or return to work or school until cleared by Neurosurgeon. Do not restart your Aspirin or take any Motrin/ NSAIDS until checking with your Neurosurgeon.     Please return immediately to the ED for any of the following: fevers, bleeding, swelling, pain not relieved by medication, increased sluggishness or irritability, increased nausea or vomiting, inability to tolerate foods or liquids, increased numbness/tingling/ or inability to move extremities, seizures. Please follow up Neurosurgeon Dr. Kowalski in one to two weeks 7/16. Please call office to make appointment.   Please follow up with Primary Care Physician. Please call office to make appointment. BP check.    May shower postoperative day number 4 (7/10). please keep incision clean and dry, do not submerge wound in water for prolonged periods of time, pat dry after showering, and do not use any creams or ointments to incision.     Please do not engage in strenuous activity, heavy lifting, drive, or return to work or school until cleared by Neurosurgeon. Do not restart your Aspirin or take any Motrin/ NSAIDS until checking with your Neurosurgeon.     Please return immediately to the ED for any of the following: fevers, bleeding, swelling, pain not relieved by medication, increased sluggishness or irritability, increased nausea or vomiting, inability to tolerate foods or liquids, increased numbness/tingling/ or inability to move extremities, seizures.

## 2018-07-07 NOTE — DISCHARGE NOTE ADULT - MEDICATION SUMMARY - MEDICATIONS TO TAKE
I will START or STAY ON the medications listed below when I get home from the hospital:    oxyCODONE-acetaminophen 5 mg-325 mg oral tablet  -- 1-2 tab(s) by mouth every 4 hours, As needed, Moderate Pain (4 - 6) MDD:6 tabs  -- Indication: For moderate pain    acetaminophen 325 mg oral tablet  -- 2 tab(s) by mouth every 6 hours, As needed, Mild Pain (1 - 3)  -- Indication: For mild pain    diazePAM 5 mg oral tablet  -- 1 tab(s) by mouth every 8 hours, As needed, muscle spasms MDD:3 tabs  -- Indication: For muscle spasm    senna oral tablet  -- 2 tab(s) by mouth once a day (at bedtime)  -- Indication: For Constipation, unspecified constipation type    polyethylene glycol 3350 oral powder for reconstitution  -- 17 gram(s) by mouth 2 times a day  -- Indication: For Constipation, unspecified constipation type    docusate sodium 100 mg oral capsule  -- 1 cap(s) by mouth 3 times a day  -- Indication: For Constipation, unspecified constipation type I will START or STAY ON the medications listed below when I get home from the hospital:    oxyCODONE-acetaminophen 5 mg-325 mg oral tablet  -- 1-2 tab(s) by mouth every 4 hours, As needed, Moderate Pain (4 - 6) MDD:6 tabs  -- Indication: For moderate pain    acetaminophen 325 mg oral tablet  -- 2 tab(s) by mouth every 6 hours, As needed, Mild Pain (1 - 3)  -- Indication: For mild pain    diazePAM 5 mg oral tablet  -- 1 tab(s) by mouth every 8 hours, As needed, muscle spasms MDD:3 tabs  -- Indication: For muscle spasm    senna oral tablet  -- 2 tab(s) by mouth once a day (at bedtime)  -- Indication: For Constipation, unspecified constipation type    polyethylene glycol 3350 oral powder for reconstitution  -- 17 gram(s) by mouth 2 times a day  -- Indication: For Constipation, unspecified constipation type    docusate sodium 100 mg oral capsule  -- 1 cap(s) by mouth 3 times a day  -- Indication: For Constipation, unspecified constipation type    amoxicillin 500 mg oral tablet  -- 1 tab(s) by mouth every 8 hours MDD:3 tabs  -- Finish all this medication unless otherwise directed by prescriber.    -- Indication: For UTI    lactobacillus acidophilus oral tablet  -- 2 tab(s) by mouth once a day  -- Indication: For GI prophylaxis

## 2018-07-07 NOTE — DISCHARGE NOTE ADULT - PLAN OF CARE
S/p L5-S1 fusion Please follow up Neurosurgeon Dr. Kowalski in one to two weeks 7/16. Please call office to make appointment.   Staples to be removed by Neurosurgeon on postoperative day #14 (7/20) at follow up visit.   Please follow up with Primary Care Physician. Please call office to make appointment. BP check.    May shower postoperative day number 4 (7/10). please keep incision clean and dry, do not submerge wound in water for prolonged periods of time, pat dry after showering, and do not use any creams or ointments to incision.     Please do not engage in strenuous activity, heavy lifting, drive, or return to work or school until cleared by Neurosurgeon. Do not restart your Aspirin or take any Motrin/ NSAIDS until checking with your Neurosurgeon.     Please return immediately to the ED for any of the following: fevers, bleeding, swelling, pain not relieved by medication, increased sluggishness or irritability, increased nausea or vomiting, inability to tolerate foods or liquids, increased numbness/tingling/ or inability to move extremities, seizures. Please make an appointment for follow up with neurosurgeon Dr. Kowalski in 1-2 weeks. Call [phone] to schedule an appointment. Staples will be removed at follow up appointment. Keep incision site clean and dry. No creams, lotions, or ointments to incision area. Ok to shower but do not allow water to hit incision site directly.     NO heavy lifting, strenuous activity, twisting, bending, driving, or working until cleared by your physician.   Return to ER immediately for any of the following: fever, bleeding, new onset numbness/tingling/weakness, nausea and/or vomiting, chest pain, shortness of breath, confusion, seizure, altered mental status, urinary and/or fecal incontinence or retention. Please make an appointment for follow up with your primary care physician after discharge. Continue colace, senna, miralax. Please make an appointment for follow up with your primary care physician after discharge. Please make an appointment for follow up with your primary care physician after discharge. Please take Amoxicillin 500 mg three times a day x 3 days. Take antibiotic with a probiotic like acidophilus to prevent diarrhea.

## 2018-07-07 NOTE — DISCHARGE NOTE ADULT - NS AS ACTIVITY OBS
No Heavy lifting/straining/Do not make important decisions/Walking-Indoors allowed/Do not drive or operate machinery/Showering allowed

## 2018-07-07 NOTE — PROGRESS NOTE ADULT - ASSESSMENT
ASSESSMENT AND PLAN: 52 y/o Female w/PMHx appendicitis, s/p appendectomy 4/2018 at [facility], c/b E Coli+ pelvic abscess that was drained and ileus post-op with NGT placement, was admitted for 20 days. Presented with low back pain for several years that radiates into the right lower leg, S/P epidural injections, last one a year ago without relief. Pt underwent L5-S1 fusion on 7/6/18. POD#1    NEURO: CT L spine pending   Pain Management: Continue Oxycodone 5 and 10mgQ4 hours and Tylenol for mild pain. Add dilaudid 0.5mg IV for breakthrough if needed.     PULM: Encouraged incentive spirometer     CV:    ENDO:     HEME/ONC:                      DVT ppx:     RENAL:     ID:     GI:      DISCHARGE PLANNING: ASSESSMENT AND PLAN: 52 y/o Female w/PMHx appendicitis, s/p appendectomy 4/2018 at [facility], c/b E Coli+ pelvic abscess that was drained and ileus post-op with NGT placement, was admitted for 20 days. Presented with low back pain for several years that radiates into the right lower leg, S/P epidural injections, last one a year ago without relief. Pt underwent L5-S1 fusion on 7/6/18. POD#1    NEURO: CT L spine pending. Xray L spine pending.   Pain Management: Continue Oxycodone 5 and 10mgQ4 hours and Tylenol for mild pain. Add dilaudid 0.5mg IV for breakthrough if needed.     PULM: Encouraged incentive spirometer     CV:    ENDO:     HEME/ONC:                      DVT ppx: SQL started     RENAL: Fluids switched to without k @75. Continue until pt voids.  Beltre d/c and TOV pending.     ID: Afbrile     GI:  Continue senna and colace. D/c protonix not home med.     DISCHARGE PLANNING: PT: outpatient PT  Will discuss with Neurosurgeon Dr. Kowalski ASSESSMENT AND PLAN: 54 y/o Female w/PMHx appendicitis, s/p appendectomy 4/2018 at [facility], c/b E Coli+ pelvic abscess that was drained and ileus post-op with NGT placement, was admitted for 20 days. Presented with low back pain for several years that radiates into the right lower leg, S/P epidural injections, last one a year ago without relief. Pt underwent L5-S1 fusion on 7/6/18. POD#1.  Pt found to be orthostatic when OOB with physical therapy SBP dropped to 79 and resolved to 112 when placed back in bed.      NEURO: CT L spine pending. Xray L spine pending.   Pain Management: Continue Oxycodone 5 and 10mgQ4 hours and Tylenol for mild pain. Add dilaudid 0.5mg IV for breakthrough if needed.     PULM: Encouraged incentive spirometer     CV: Orthostatic with PT. Given 1L bolus and fluid rate increased to 90.  F/u CBC in AM     ENDO:     HEME/ONC:                     DVT ppx: SQL started     RENAL: Fluids switched to without k @75. Continue until pt voids.  Beltre d/c and TOV pending.     ID: Afbrile     GI:  Continue senna and colace. D/c protonix not home med.     DISCHARGE PLANNING: PT: outpatient PT  Will discuss with Neurosurgeon Dr. Kowalski

## 2018-07-07 NOTE — DISCHARGE NOTE ADULT - REASON FOR ADMISSION
Lower Back Pain Admitted 7/6 for L5-S1 Posterior lumbar interbody fusion Admitted 7/6 for L5-S1 Posterior lumbar interbody fusion; 7/8 post operative fever found w/ UTI

## 2018-07-07 NOTE — DISCHARGE NOTE ADULT - CARE PLAN
Principal Discharge DX:	Lumbar radiculopathy  Goal:	S/p L5-S1 fusion  Assessment and plan of treatment:	Please follow up Neurosurgeon Dr. Kowalski in one to two weeks 7/16. Please call office to make appointment.   Staples to be removed by Neurosurgeon on postoperative day #14 (7/20) at follow up visit.   Please follow up with Primary Care Physician. Please call office to make appointment. BP check.    May shower postoperative day number 4 (7/10). please keep incision clean and dry, do not submerge wound in water for prolonged periods of time, pat dry after showering, and do not use any creams or ointments to incision.     Please do not engage in strenuous activity, heavy lifting, drive, or return to work or school until cleared by Neurosurgeon. Do not restart your Aspirin or take any Motrin/ NSAIDS until checking with your Neurosurgeon.     Please return immediately to the ED for any of the following: fevers, bleeding, swelling, pain not relieved by medication, increased sluggishness or irritability, increased nausea or vomiting, inability to tolerate foods or liquids, increased numbness/tingling/ or inability to move extremities, seizures. Principal Discharge DX:	Lumbar radiculopathy  Goal:	S/p L5-S1 fusion  Assessment and plan of treatment:	Please make an appointment for follow up with neurosurgeon Dr. Kowalski in 1-2 weeks. Call [phone] to schedule an appointment. Staples will be removed at follow up appointment. Keep incision site clean and dry. No creams, lotions, or ointments to incision area. Ok to shower but do not allow water to hit incision site directly.     NO heavy lifting, strenuous activity, twisting, bending, driving, or working until cleared by your physician.   Return to ER immediately for any of the following: fever, bleeding, new onset numbness/tingling/weakness, nausea and/or vomiting, chest pain, shortness of breath, confusion, seizure, altered mental status, urinary and/or fecal incontinence or retention.  Secondary Diagnosis:	Constipation, unspecified constipation type  Assessment and plan of treatment:	Please make an appointment for follow up with your primary care physician after discharge. Continue colace, senna, miralax.  Secondary Diagnosis:	Orthostatic hypotension  Assessment and plan of treatment:	Please make an appointment for follow up with your primary care physician after discharge. Principal Discharge DX:	Lumbar radiculopathy  Goal:	S/p L5-S1 fusion  Assessment and plan of treatment:	Please make an appointment for follow up with neurosurgeon Dr. Kowalski in 1-2 weeks. Call [phone] to schedule an appointment. Staples will be removed at follow up appointment. Keep incision site clean and dry. No creams, lotions, or ointments to incision area. Ok to shower but do not allow water to hit incision site directly.     NO heavy lifting, strenuous activity, twisting, bending, driving, or working until cleared by your physician.   Return to ER immediately for any of the following: fever, bleeding, new onset numbness/tingling/weakness, nausea and/or vomiting, chest pain, shortness of breath, confusion, seizure, altered mental status, urinary and/or fecal incontinence or retention.  Secondary Diagnosis:	Constipation, unspecified constipation type  Assessment and plan of treatment:	Please make an appointment for follow up with your primary care physician after discharge. Continue colace, senna, miralax.  Secondary Diagnosis:	Orthostatic hypotension  Assessment and plan of treatment:	Please make an appointment for follow up with your primary care physician after discharge.  Secondary Diagnosis:	Urinary tract infection  Assessment and plan of treatment:	Please make an appointment for follow up with your primary care physician after discharge. Please take Amoxicillin 500 mg three times a day x 3 days. Take antibiotic with a probiotic like acidophilus to prevent diarrhea.

## 2018-07-07 NOTE — PROGRESS NOTE ADULT - SUBJECTIVE AND OBJECTIVE BOX
SUBJECTIVE: Pt seen and examined. Pt explains that she is in pain but feels relief after pain medication. Pt denies any other complaints     PAST MEDICAL & SURGICAL HISTORY:  Ileus following gastrointestinal surgery  Pelvic abscess in female  Appendicitis  Lumbar radiculopathy  Herniated lumbar intervertebral disc  S/P appendectomy: and drainage of pelvic abscess 4/2018    Vital Signs Last 24 Hrs  T(C): 36.8 (07 Jul 2018 04:56), Max: 36.9 (06 Jul 2018 22:00)  T(F): 98.2 (07 Jul 2018 04:56), Max: 98.4 (06 Jul 2018 22:00)  HR: 76 (07 Jul 2018 04:56) (70 - 90)  BP: 107/59 (07 Jul 2018 04:56) (102/56 - 144/76)  BP(mean): 75 (06 Jul 2018 19:00) (74 - 101)  RR: 16 (07 Jul 2018 04:56) (16 - 17)  SpO2: 99% (07 Jul 2018 04:56) (91% - 100%)                        12.6   8.8   )-----------( 226      ( 07 Jul 2018 08:15 )             39.1    07-07    140  |  106  |  9   ----------------------------<  98  4.5   |  24  |  0.60    Ca    8.4      07 Jul 2018 08:15    TPro  6.8  /  Alb  4.2  /  TBili  0.1<L>  /  DBili  x   /  AST  17  /  ALT  14  /  AlkPhos  66  07-06     NEUROIMAGING: CT L spine pending     PHYSICAL EXAM:    Constitutional: No Acute Distress     Neurological: AOx3, Following Commands, Moving all Extremities     Motor exam:          Upper extremity                         Delt     Bicep     Tricep    HG                                                 R         5/5        5/5        5/5       5/5                                               L          5/5        5/5        5/5       5/5          Lower extremity                        HF         KF        KE       DF         PF                                                  R        5/5        5/5        5/5       5/5         5/5                                               L         5/5        5/5       5/5       5/5          5/5                                                 Sensation: [x] intact to light touch  [] decreased:     Pulmonary: Clear to Auscultation, No rales, No rhonchi, No wheezes     Cardiovascular: S1, S2, Regular rate and rhythm     Gastrointestinal: Soft, Non-tender, Non-distended     Extremities: No calf tenderness     Incision: Dressing in place. Clean dry and intact     MEDICATIONS:   Antibiotics:    Neuro:  acetaminophen   Tablet 650 milliGRAM(s) Oral every 6 hours PRN For Temp greater than 38 C (100.4 F)  acetaminophen   Tablet. 650 milliGRAM(s) Oral every 6 hours PRN Mild Pain (1 - 3)  oxyCODONE    IR 5 milliGRAM(s) Oral every 4 hours PRN Moderate Pain (4 - 6)  oxyCODONE    IR 10 milliGRAM(s) Oral every 4 hours PRN Severe Pain (7 - 10)    Anticoagulation:    Cardiology:    Endo:     Pulm:    GI/:  docusate sodium 100 milliGRAM(s) Oral three times a day  pantoprazole    Tablet 40 milliGRAM(s) Oral daily  senna 2 Tablet(s) Oral at bedtime PRN    Other:  sodium chloride 0.9% with potassium chloride 20 mEq/L 1000 milliLiter(s) IV Continuous <Continuous> SUBJECTIVE: Pt seen and examined. Pt explains that she is in pain but feels relief after pain medication. Pt was up and walking with PT and felt diaphoretic and cool.  Pt explained that she felt the same way when she initially got up with PT overnight.  Pt denies any other complaints.     PAST MEDICAL & SURGICAL HISTORY:  Ileus following gastrointestinal surgery  Pelvic abscess in female  Appendicitis  Lumbar radiculopathy  Herniated lumbar intervertebral disc  S/P appendectomy: and drainage of pelvic abscess 4/2018    Vital Signs Last 24 Hrs  T(C): 36.8 (07 Jul 2018 04:56), Max: 36.9 (06 Jul 2018 22:00)  T(F): 98.2 (07 Jul 2018 04:56), Max: 98.4 (06 Jul 2018 22:00)  HR: 76 (07 Jul 2018 04:56) (70 - 90)  BP: 107/59 (07 Jul 2018 04:56) (102/56 - 144/76)  BP(mean): 75 (06 Jul 2018 19:00) (74 - 101)  RR: 16 (07 Jul 2018 04:56) (16 - 17)  SpO2: 99% (07 Jul 2018 04:56) (91% - 100%)                        12.6   8.8   )-----------( 226      ( 07 Jul 2018 08:15 )             39.1    07-07    140  |  106  |  9   ----------------------------<  98  4.5   |  24  |  0.60    Ca    8.4      07 Jul 2018 08:15    TPro  6.8  /  Alb  4.2  /  TBili  0.1<L>  /  DBili  x   /  AST  17  /  ALT  14  /  AlkPhos  66  07-06     NEUROIMAGING: CT L spine pending     PHYSICAL EXAM:    Constitutional: No Acute Distress     Neurological: AOx3, Following Commands, Moving all Extremities     Motor exam:          Upper extremity                         Delt     Bicep     Tricep    HG                                                 R         5/5        5/5        5/5       5/5                                               L          5/5        5/5        5/5       5/5          Lower extremity                        HF         KF        KE       DF         PF                                                  R        5/5        5/5        5/5       5/5         5/5                                               L         5/5        5/5       5/5       5/5          5/5                                                 Sensation: [x] intact to light touch  [] decreased:     Pulmonary: Clear to Auscultation, No rales, No rhonchi, No wheezes     Cardiovascular: S1, S2, Regular rate and rhythm     Gastrointestinal: Soft, Non-tender, Non-distended     Extremities: No calf tenderness     Incision: Dressing in place. Clean dry and intact     MEDICATIONS:   Antibiotics:    Neuro:  acetaminophen   Tablet 650 milliGRAM(s) Oral every 6 hours PRN For Temp greater than 38 C (100.4 F)  acetaminophen   Tablet. 650 milliGRAM(s) Oral every 6 hours PRN Mild Pain (1 - 3)  oxyCODONE    IR 5 milliGRAM(s) Oral every 4 hours PRN Moderate Pain (4 - 6)  oxyCODONE    IR 10 milliGRAM(s) Oral every 4 hours PRN Severe Pain (7 - 10)    Anticoagulation:    Cardiology:    Endo:     Pulm:    GI/:  docusate sodium 100 milliGRAM(s) Oral three times a day  pantoprazole    Tablet 40 milliGRAM(s) Oral daily  senna 2 Tablet(s) Oral at bedtime PRN    Other:  sodium chloride 0.9% with potassium chloride 20 mEq/L 1000 milliLiter(s) IV Continuous <Continuous>

## 2018-07-07 NOTE — DISCHARGE NOTE ADULT - PATIENT PORTAL LINK FT
You can access the [facility] Patient Portal, offered by [facility], by registering with the following website: [email or web address]

## 2018-07-08 DIAGNOSIS — I95.1 ORTHOSTATIC HYPOTENSION: ICD-10-CM

## 2018-07-08 DIAGNOSIS — R65.10 SYSTEMIC INFLAMMATORY RESPONSE SYNDROME (SIRS) OF NON-INFECTIOUS ORIGIN WITHOUT ACUTE ORGAN DYSFUNCTION: ICD-10-CM

## 2018-07-08 DIAGNOSIS — M54.16 RADICULOPATHY, LUMBAR REGION: ICD-10-CM

## 2018-07-08 LAB
APPEARANCE UR: CLEAR — SIGNIFICANT CHANGE UP
BACTERIA # UR AUTO: ABNORMAL /HPF
BILIRUB UR-MCNC: NEGATIVE — SIGNIFICANT CHANGE UP
COLOR SPEC: SIGNIFICANT CHANGE UP
DIFF PNL FLD: ABNORMAL
EPI CELLS # UR: ABNORMAL /HPF
GLUCOSE UR QL: NEGATIVE — SIGNIFICANT CHANGE UP
HCT VFR BLD CALC: 36.4 % — SIGNIFICANT CHANGE UP (ref 34.5–45)
HGB BLD-MCNC: 12 G/DL — SIGNIFICANT CHANGE UP (ref 11.5–15.5)
KETONES UR-MCNC: ABNORMAL
LEUKOCYTE ESTERASE UR-ACNC: ABNORMAL
MCHC RBC-ENTMCNC: 30.5 PG — SIGNIFICANT CHANGE UP (ref 27–34)
MCHC RBC-ENTMCNC: 33 GM/DL — SIGNIFICANT CHANGE UP (ref 32–36)
MCV RBC AUTO: 92.6 FL — SIGNIFICANT CHANGE UP (ref 80–100)
NITRITE UR-MCNC: NEGATIVE — SIGNIFICANT CHANGE UP
PH UR: 7 — SIGNIFICANT CHANGE UP (ref 5–8)
PLATELET # BLD AUTO: 201 K/UL — SIGNIFICANT CHANGE UP (ref 150–400)
PROT UR-MCNC: NEGATIVE — SIGNIFICANT CHANGE UP
RBC # BLD: 3.93 M/UL — SIGNIFICANT CHANGE UP (ref 3.8–5.2)
RBC # FLD: 14.4 % — SIGNIFICANT CHANGE UP (ref 10.3–14.5)
RBC CASTS # UR COMP ASSIST: ABNORMAL /HPF (ref 0–2)
SP GR SPEC: 1.01 — LOW (ref 1.01–1.02)
UROBILINOGEN FLD QL: NEGATIVE — SIGNIFICANT CHANGE UP
WBC # BLD: 9.45 K/UL — SIGNIFICANT CHANGE UP (ref 3.8–10.5)
WBC # FLD AUTO: 9.45 K/UL — SIGNIFICANT CHANGE UP (ref 3.8–10.5)
WBC UR QL: ABNORMAL /HPF (ref 0–5)

## 2018-07-08 PROCEDURE — 93970 EXTREMITY STUDY: CPT | Mod: 26

## 2018-07-08 PROCEDURE — 99223 1ST HOSP IP/OBS HIGH 75: CPT

## 2018-07-08 PROCEDURE — 71045 X-RAY EXAM CHEST 1 VIEW: CPT | Mod: 26

## 2018-07-08 RX ORDER — SODIUM CHLORIDE 9 MG/ML
1000 INJECTION INTRAMUSCULAR; INTRAVENOUS; SUBCUTANEOUS ONCE
Qty: 0 | Refills: 0 | Status: COMPLETED | OUTPATIENT
Start: 2018-07-08 | End: 2018-07-08

## 2018-07-08 RX ORDER — SODIUM CHLORIDE 9 MG/ML
500 INJECTION INTRAMUSCULAR; INTRAVENOUS; SUBCUTANEOUS ONCE
Qty: 0 | Refills: 0 | Status: COMPLETED | OUTPATIENT
Start: 2018-07-08 | End: 2018-07-08

## 2018-07-08 RX ORDER — POLYETHYLENE GLYCOL 3350 17 G/17G
17 POWDER, FOR SOLUTION ORAL
Qty: 0 | Refills: 0 | Status: DISCONTINUED | OUTPATIENT
Start: 2018-07-08 | End: 2018-07-10

## 2018-07-08 RX ADMIN — OXYCODONE AND ACETAMINOPHEN 2 TABLET(S): 5; 325 TABLET ORAL at 10:30

## 2018-07-08 RX ADMIN — SODIUM CHLORIDE 2000 MILLILITER(S): 9 INJECTION INTRAMUSCULAR; INTRAVENOUS; SUBCUTANEOUS at 11:56

## 2018-07-08 RX ADMIN — OXYCODONE AND ACETAMINOPHEN 2 TABLET(S): 5; 325 TABLET ORAL at 10:58

## 2018-07-08 RX ADMIN — Medication 100 MILLIGRAM(S): at 05:52

## 2018-07-08 RX ADMIN — OXYCODONE AND ACETAMINOPHEN 2 TABLET(S): 5; 325 TABLET ORAL at 15:05

## 2018-07-08 RX ADMIN — ENOXAPARIN SODIUM 40 MILLIGRAM(S): 100 INJECTION SUBCUTANEOUS at 17:50

## 2018-07-08 RX ADMIN — OXYCODONE AND ACETAMINOPHEN 2 TABLET(S): 5; 325 TABLET ORAL at 15:32

## 2018-07-08 RX ADMIN — Medication 100 MILLIGRAM(S): at 22:12

## 2018-07-08 RX ADMIN — Medication 100 MILLIGRAM(S): at 14:42

## 2018-07-08 RX ADMIN — SODIUM CHLORIDE 1000 MILLILITER(S): 9 INJECTION INTRAMUSCULAR; INTRAVENOUS; SUBCUTANEOUS at 11:56

## 2018-07-08 RX ADMIN — OXYCODONE AND ACETAMINOPHEN 2 TABLET(S): 5; 325 TABLET ORAL at 05:52

## 2018-07-08 RX ADMIN — OXYCODONE AND ACETAMINOPHEN 2 TABLET(S): 5; 325 TABLET ORAL at 21:10

## 2018-07-08 RX ADMIN — POLYETHYLENE GLYCOL 3350 17 GRAM(S): 17 POWDER, FOR SOLUTION ORAL at 17:51

## 2018-07-08 RX ADMIN — OXYCODONE AND ACETAMINOPHEN 2 TABLET(S): 5; 325 TABLET ORAL at 20:39

## 2018-07-08 NOTE — CONSULT NOTE ADULT - PROBLEM SELECTOR RECOMMENDATION 9
+ fever on POD #2, No leukocytosis and no localizing symptom/sign of infection at this time.   UA mildly + but also with moderate epithelial cells and had klein in for 2 days. Patient with no urinary symptoms at this time.   Check urine culture  If continues to spike fever, can empirically start IV ceftriaxone after urine culture sent and consider ID consult.   Follow up blood cx and CXR result (on my review, appears grossly clear)  Check LE duplex  Encouraged incentive spirometry

## 2018-07-08 NOTE — PROGRESS NOTE ADULT - ASSESSMENT
ASSESSMENT AND PLAN: 54 y/o Female w/PMHx appendicitis, s/p appendectomy 4/2018 at [facility], c/b E Coli+ pelvic abscess that was drained and ileus post-op with NGT placement, was admitted for 20 days. Presented with low back pain for several years that radiates into the right lower leg, S/P epidural injections, last one a year ago without relief. Pt underwent L5-S1 fusion on 7/6/18. POD#2.  Pt found to be orthostatic when OOB with physical therapy, pt given 1L bolus and increased IVF.      NEURO: CT L spine done F/u report. Xray L spine pending (when pt is able to tolerate OOB adequately)   Pain Management: Continue Oxycodone 5 and 10mgQ4 hours and Tylenol for mild pain.  IV dilaudid 0.5mg only for severe breakthrough pain.  D/c if not needed today. Valium added for muscle spasms yesterday.      PULM: Encouraged incentive spirometer     CV: Watch orthostatics today. H/H stable on AM labs. Raise HOB and acclimate before PT sees today.     HEME/ONC:                     DVT ppx: SQL     RENAL: Fluids at 90. Beltre d/c today and TOV pending.     ID: Afbrile     GI:  Continue senna and colace. History of ileus, will add miralax to aid with BM.     DISCHARGE PLANNING: PT: outpatient PT  Will discuss with Neurosurgeon Dr. Kowalski ASSESSMENT AND PLAN: 54 y/o Female w/PMHx appendicitis, s/p appendectomy 4/2018 at [facility], c/b E Coli+ pelvic abscess that was drained and ileus post-op with NGT placement, was admitted for 20 days. Presented with low back pain for several years that radiates into the right lower leg, S/P epidural injections, last one a year ago without relief. Pt underwent L5-S1 fusion on 7/6/18. POD#2.  Pt found to be orthostatic when OOB with physical therapy, pt given 1L bolus and increased IVF.  Pt again orthostatic with PT today. Pt stood up and felt dizzy, BP reading was 92/60. Pt given 1.5L bolus. Will try and get out of bed later today after fluids.  Pt says she feels very dry.      NEURO: CT L spine done F/u report. Xray L spine pending (when pt is able to tolerate OOB adequately)   Pain Management: Continue Oxycodone 5 and 10mgQ4 hours and Tylenol for mild pain.  IV dilaudid 0.5mg only for severe breakthrough pain.  D/c if not needed today. Valium added for muscle spasms yesterday.      PULM: Encouraged incentive spirometer     CV: Watch orthostatics today. H/H stable on AM labs. Raise HOB and acclimate before PT sees today.     HEME/ONC:                     DVT ppx: SQL     RENAL: Fluids at 90. Beltre d/c today and TOV pending.     ID: Afbrile     GI:  Continue senna and colace. History of ileus, will add miralax to aid with BM.     DISCHARGE PLANNING: PT: outpatient PT  Will discuss with Neurosurgeon Dr. Kowalski

## 2018-07-08 NOTE — PROVIDER CONTACT NOTE (OTHER) - ASSESSMENT
Back incision with staples JAKI, c/d/i. No s/s infection noted. Beltre d/c this AM, voiding w ease. Denies burning/frequency, pt voiding large amounts.

## 2018-07-08 NOTE — PROGRESS NOTE ADULT - SUBJECTIVE AND OBJECTIVE BOX
SUBJECTIVE: Pt seen and examined. Pt explains that she feels better since yesterday and was able to sleep overnight.      PAST MEDICAL & SURGICAL HISTORY:  Ileus following gastrointestinal surgery  Pelvic abscess in female  Appendicitis  Lumbar radiculopathy  Herniated lumbar intervertebral disc  S/P appendectomy: and drainage of pelvic abscess 4/2018    Vital Signs Last 24 Hrs  T(C): 36.8 (08 Jul 2018 05:51), Max: 37.6 (07 Jul 2018 12:05)  T(F): 98.2 (08 Jul 2018 05:51), Max: 99.7 (07 Jul 2018 12:05)  HR: 100 (08 Jul 2018 05:51) (75 - 100)  BP: 115/69 (08 Jul 2018 05:51) (79/54 - 115/69)  RR: 18 (08 Jul 2018 05:51) (18 - 18)  SpO2: 97% (08 Jul 2018 05:51) (97% - 100%)                       12.0   9.45  )-----------( 201      ( 08 Jul 2018 07:12 )             36.4    07-07    140  |  106  |  9   ----------------------------<  98  4.5   |  24  |  0.60    Ca    8.4      07 Jul 2018 08:15    TPro  6.8  /  Alb  4.2  /  TBili  0.1<L>  /  DBili  x   /  AST  17  /  ALT  14  /  AlkPhos  66  07-06     NEUROIMAGING: CT L spine done - F/u report    PHYSICAL EXAM:    Constitutional: No Acute Distress     Neurological: AOx3, Following Commands, Moving all Extremities     Motor exam:          Upper extremity                         Delt     Bicep     Tricep    HG                                                 R         5/5        5/5        5/5       5/5                                               L          5/5        5/5        5/5       5/5          Lower extremity                        HF         KF        KE       DF         PF                                                  R        5/5        5/5        5/5       5/5         5/5                                               L         5/5        5/5       5/5       5/5          5/5                                                 Sensation: [x] intact to light touch  [] decreased:     Pulmonary: Clear to Auscultation, No rales, No rhonchi, No wheezes     Cardiovascular: S1, S2, Regular rate and rhythm     Gastrointestinal: Soft, Non-tender, Non-distended     Extremities: No calf tenderness     Incision: Dressing removed.  Staples in place, incision clean, dry and intact     MEDICATIONS:   Antibiotics:    Neuro:  acetaminophen   Tablet 650 milliGRAM(s) Oral every 6 hours PRN For Temp greater than 38 C (100.4 F)  acetaminophen   Tablet. 650 milliGRAM(s) Oral every 6 hours PRN Mild Pain (1 - 3)  diazepam    Tablet 5 milliGRAM(s) Oral every 8 hours PRN muscle spasms  HYDROmorphone  Injectable 0.5 milliGRAM(s) IV Push every 4 hours PRN Breakthrough pain  oxyCODONE    5 mG/acetaminophen 325 mG 1 Tablet(s) Oral every 4 hours PRN Moderate Pain (4 - 6)  oxyCODONE    5 mG/acetaminophen 325 mG 2 Tablet(s) Oral every 4 hours PRN Severe Pain (7 - 10)    Anticoagulation:  enoxaparin Injectable 40 milliGRAM(s) SubCutaneous <User Schedule>    Cardiology:    Endo:     Pulm:    GI/:  docusate sodium 100 milliGRAM(s) Oral three times a day  senna 2 Tablet(s) Oral at bedtime PRN    Other:  sodium chloride 0.9%. 1000 milliLiter(s) IV Continuous <Continuous> SUBJECTIVE: Pt seen and examined. Pt explains that she feels better since yesterday and was able to sleep overnight.      PAST MEDICAL & SURGICAL HISTORY:  Ileus following gastrointestinal surgery  Pelvic abscess in female  Appendicitis  Lumbar radiculopathy  Herniated lumbar intervertebral disc  S/P appendectomy: and drainage of pelvic abscess 4/2018    Vital Signs Last 24 Hrs  T(C): 36.8 (08 Jul 2018 05:51), Max: 37.6 (07 Jul 2018 12:05)  T(F): 98.2 (08 Jul 2018 05:51), Max: 99.7 (07 Jul 2018 12:05)  HR: 100 (08 Jul 2018 05:51) (75 - 100)  BP: 115/69 (08 Jul 2018 05:51) (79/54 - 115/69)  RR: 18 (08 Jul 2018 05:51) (18 - 18)  SpO2: 97% (08 Jul 2018 05:51) (97% - 100%)                       12.0   9.45  )-----------( 201      ( 08 Jul 2018 07:12 )             36.4    07-07    140  |  106  |  9   ----------------------------<  98  4.5   |  24  |  0.60    Ca    8.4      07 Jul 2018 08:15    TPro  6.8  /  Alb  4.2  /  TBili  0.1<L>  /  DBili  x   /  AST  17  /  ALT  14  /  AlkPhos  66  07-06     NEUROIMAGING: CT L spine done - F/u report    PHYSICAL EXAM:    Constitutional: No Acute Distress     Neurological: AOx3, Following Commands, Moving all Extremities     Motor exam:          Upper extremity                         Delt     Bicep     Tricep    HG                                                 R         5/5        5/5        5/5       5/5                                               L          5/5        5/5        5/5       5/5          Lower extremity                        HF         KF        KE       DF         PF                                                  R        5/5        5/5        5/5       5/5         5/5                                               L         5/5        5/5       5/5       5/5          5/5                                                 Sensation: [x] intact to light touch  [] decreased:     Pulmonary: Clear to Auscultation, No rales, No rhonchi, No wheezes     Cardiovascular: S1, S2, Regular rate and rhythm     Gastrointestinal: Soft, Non-tender, Non-distended. Active bowel sounds.      Extremities: No calf tenderness     Incision: Dressing removed.  Staples in place, incision clean, dry and intact     MEDICATIONS:   Antibiotics:    Neuro:  acetaminophen   Tablet 650 milliGRAM(s) Oral every 6 hours PRN For Temp greater than 38 C (100.4 F)  acetaminophen   Tablet. 650 milliGRAM(s) Oral every 6 hours PRN Mild Pain (1 - 3)  diazepam    Tablet 5 milliGRAM(s) Oral every 8 hours PRN muscle spasms  HYDROmorphone  Injectable 0.5 milliGRAM(s) IV Push every 4 hours PRN Breakthrough pain  oxyCODONE    5 mG/acetaminophen 325 mG 1 Tablet(s) Oral every 4 hours PRN Moderate Pain (4 - 6)  oxyCODONE    5 mG/acetaminophen 325 mG 2 Tablet(s) Oral every 4 hours PRN Severe Pain (7 - 10)    Anticoagulation:  enoxaparin Injectable 40 milliGRAM(s) SubCutaneous <User Schedule>    Cardiology:    Endo:     Pulm:    GI/:  docusate sodium 100 milliGRAM(s) Oral three times a day  senna 2 Tablet(s) Oral at bedtime PRN    Other:  sodium chloride 0.9%. 1000 milliLiter(s) IV Continuous <Continuous>

## 2018-07-08 NOTE — PROVIDER CONTACT NOTE (OTHER) - RECOMMENDATIONS
Pt encouraged I/S use, temp still elevated. Pt received 2 percocets 1 hour ago for moderate back pain w good relief. Pt on IVF as ordered.

## 2018-07-08 NOTE — PROVIDER CONTACT NOTE (OTHER) - ACTION/TREATMENT ORDERED:
Margareth UGARTE aware. Pt uable to take tylenol as she received 2 percocets for pain 1 hour ago. Cool compresses applied and light linens on. Blood cx, UA, and chest xray ordered as per Margareth UGARTE. Margareth UGARTE aware. Pt uable to take tylenol as she received 2 percocets for pain 1 hour ago. Cool compresses applied and light linens on. Blood cx, UA, and chest x-ray ordered as per Margareth UGARTE.

## 2018-07-09 DIAGNOSIS — K59.00 CONSTIPATION, UNSPECIFIED: ICD-10-CM

## 2018-07-09 LAB
ANION GAP SERPL CALC-SCNC: 12 MMOL/L — SIGNIFICANT CHANGE UP (ref 5–17)
BUN SERPL-MCNC: 6 MG/DL — LOW (ref 7–23)
CALCIUM SERPL-MCNC: 8.3 MG/DL — LOW (ref 8.4–10.5)
CHLORIDE SERPL-SCNC: 104 MMOL/L — SIGNIFICANT CHANGE UP (ref 96–108)
CO2 SERPL-SCNC: 22 MMOL/L — SIGNIFICANT CHANGE UP (ref 22–31)
CREAT SERPL-MCNC: 0.51 MG/DL — SIGNIFICANT CHANGE UP (ref 0.5–1.3)
GLUCOSE SERPL-MCNC: 120 MG/DL — HIGH (ref 70–99)
HCT VFR BLD CALC: 34.3 % — LOW (ref 34.5–45)
HGB BLD-MCNC: 11.8 G/DL — SIGNIFICANT CHANGE UP (ref 11.5–15.5)
MCHC RBC-ENTMCNC: 32.3 PG — SIGNIFICANT CHANGE UP (ref 27–34)
MCHC RBC-ENTMCNC: 34.5 GM/DL — SIGNIFICANT CHANGE UP (ref 32–36)
MCV RBC AUTO: 93.6 FL — SIGNIFICANT CHANGE UP (ref 80–100)
PLATELET # BLD AUTO: 201 K/UL — SIGNIFICANT CHANGE UP (ref 150–400)
POTASSIUM SERPL-MCNC: 3.7 MMOL/L — SIGNIFICANT CHANGE UP (ref 3.5–5.3)
POTASSIUM SERPL-SCNC: 3.7 MMOL/L — SIGNIFICANT CHANGE UP (ref 3.5–5.3)
RBC # BLD: 3.66 M/UL — LOW (ref 3.8–5.2)
RBC # FLD: 13.2 % — SIGNIFICANT CHANGE UP (ref 10.3–14.5)
SODIUM SERPL-SCNC: 138 MMOL/L — SIGNIFICANT CHANGE UP (ref 135–145)
WBC # BLD: 6.4 K/UL — SIGNIFICANT CHANGE UP (ref 3.8–10.5)
WBC # FLD AUTO: 6.4 K/UL — SIGNIFICANT CHANGE UP (ref 3.8–10.5)

## 2018-07-09 PROCEDURE — 99233 SBSQ HOSP IP/OBS HIGH 50: CPT

## 2018-07-09 PROCEDURE — 72100 X-RAY EXAM L-S SPINE 2/3 VWS: CPT | Mod: 26

## 2018-07-09 RX ORDER — SENNA PLUS 8.6 MG/1
2 TABLET ORAL AT BEDTIME
Qty: 0 | Refills: 0 | Status: DISCONTINUED | OUTPATIENT
Start: 2018-07-09 | End: 2018-07-10

## 2018-07-09 RX ORDER — SODIUM CHLORIDE 9 MG/ML
1000 INJECTION INTRAMUSCULAR; INTRAVENOUS; SUBCUTANEOUS ONCE
Qty: 0 | Refills: 0 | Status: COMPLETED | OUTPATIENT
Start: 2018-07-09 | End: 2018-07-09

## 2018-07-09 RX ADMIN — Medication 100 MILLIGRAM(S): at 22:39

## 2018-07-09 RX ADMIN — Medication 100 MILLIGRAM(S): at 17:58

## 2018-07-09 RX ADMIN — POLYETHYLENE GLYCOL 3350 17 GRAM(S): 17 POWDER, FOR SOLUTION ORAL at 06:44

## 2018-07-09 RX ADMIN — ENOXAPARIN SODIUM 40 MILLIGRAM(S): 100 INJECTION SUBCUTANEOUS at 17:58

## 2018-07-09 RX ADMIN — SENNA PLUS 2 TABLET(S): 8.6 TABLET ORAL at 22:39

## 2018-07-09 RX ADMIN — Medication 100 MILLIGRAM(S): at 06:44

## 2018-07-09 RX ADMIN — POLYETHYLENE GLYCOL 3350 17 GRAM(S): 17 POWDER, FOR SOLUTION ORAL at 17:58

## 2018-07-09 RX ADMIN — SODIUM CHLORIDE 2000 MILLILITER(S): 9 INJECTION INTRAMUSCULAR; INTRAVENOUS; SUBCUTANEOUS at 08:38

## 2018-07-09 NOTE — PROGRESS NOTE ADULT - ASSESSMENT
HPI: 54 y/o F PMH appendicitis, S/P appendectomy 4/2018 at [facility], c/b E Coli+ pelvic abscess that was drained and ileus post-op with NGT placement, was admitted for 20 days, presents today with c/o low back pain for several years that radiates into the right lower leg, S/P epidural injections, last one a year ago without relief.  Scheduled for L5-S12 Metrx posterior lumbar fusion.    PROCEDURE: 7/6 s/p L5-S1 posterior lumbar interbody fusion, POD#3    PLAN:  -Post operative standing x-rays pending  -Percocet PRN for pain control, valium PRN for muscle spasm  -Cardiology consult called for orthostatic hypotension. Maintain euvolemia.  -Awaiting blood cultures and urine culture. UA mildly positive but patient asymptomatic. Monitor for signs/symptoms of infection; consider treating w/ antibiotics if persistently febrile.   -Encouraged mobilization  -Encouraged incentive spirometer and instruted patient on proper use  -DVT prophylaxis: SQL, venodynes  -Dispo: outpatient PT  -[id number]

## 2018-07-09 NOTE — PROGRESS NOTE ADULT - ASSESSMENT
53F with hx of appendicitis s/p appendectomy c/b pelvic abscess (+ ecoli) requiring drainage and course of abx, ? postop ileus(3-4/2018), progressively worsening LBP with RLE radiculopathy and numbness admitted s/p L5-S1 metrex PLIF on 7/6. Postop course c/b orthostatic hypotension and fever to 102 on POD#2.

## 2018-07-09 NOTE — PROGRESS NOTE ADULT - SUBJECTIVE AND OBJECTIVE BOX
SUBJECTIVE: Patient seen and examined at bedside. Moderate incisional pain, better with current pain regimen. RLE radiculopathy improved post operatively. Orthostatic this morning when attempting to get out of bed. Mildly positive UA yesterday as part of fever work up; denies dysuria or urinary incontinence. Awaiting culture.     Vital Signs Last 24 Hrs  T(C): 36.9 (07-09-18 @ 08:22), Max: 39 (07-08-18 @ 15:58)  T(F): 98.5 (07-09-18 @ 08:22), Max: 102.2 (07-08-18 @ 15:58)  HR: 86 (07-09-18 @ 08:22) (86 - 97)  BP: 118/71 (07-09-18 @ 08:22) (105/67 - 130/70)  RR: 16 (07-09-18 @ 08:22) (16 - 18)  SpO2: 96% (07-09-18 @ 08:22) (96% - 100%)    PHYSICAL EXAM:    Constitutional: No Acute Distress, resting comfortably in bed    Neurological: Awake, alert, oriented to person, place and time, speech clear and fluent, face equal, tongue midline, briskly following commands, no drift, moves all extremities with 5/5 strength, sensation intact to light touch throughout, pupils 4mm and reactive bilaterally, extraocular movements intact, no nystagmus    Incision: +staples C/D/I    Pulmonary: Clear to Auscultation, No rales, No rhonchi, No wheezes     Cardiovascular: S1, S2, Regular rate and rhythm     Gastrointestinal: Soft, Non-tender, Non-distended, +bowel sounds x 4    Extremities: No calf tenderness bilaterally, no cyanosis, clubbing or edema      LABS:                          11.8   6.4   )-----------( 201      ( 09 Jul 2018 08:37 )             34.3    07-09    138  |  104  |  6<L>  ----------------------------<  120<H>  3.7   |  22  |  0.51    Ca    8.3<L>      09 Jul 2018 08:37        07-08 @ 07:01  -  07-09 @ 07:00  --------------------------------------------------------  IN: 3050 mL / OUT: 3800 mL / NET: -750 mL    07-09 @ 07:01  - 07-09 @ 11:07  --------------------------------------------------------  IN: 0 mL / OUT: 400 mL / NET: -400 mL      IMAGING:     MEDICATIONS:  Antibiotics:    Neuro:  acetaminophen   Tablet 650 milliGRAM(s) Oral every 6 hours PRN For Temp greater than 38 C (100.4 F)  acetaminophen   Tablet. 650 milliGRAM(s) Oral every 6 hours PRN Mild Pain (1 - 3)  diazepam    Tablet 5 milliGRAM(s) Oral every 8 hours PRN muscle spasms  HYDROmorphone  Injectable 0.5 milliGRAM(s) IV Push every 4 hours PRN Breakthrough pain  ondansetron Injectable 4 milliGRAM(s) IV Push every 6 hours PRN Nausea and/or Vomiting  oxyCODONE    5 mG/acetaminophen 325 mG 1 Tablet(s) Oral every 4 hours PRN Moderate Pain (4 - 6)  oxyCODONE    5 mG/acetaminophen 325 mG 2 Tablet(s) Oral every 4 hours PRN Severe Pain (7 - 10)    Cardiac:    Pulm:    GI/:  docusate sodium 100 milliGRAM(s) Oral three times a day  polyethylene glycol 3350 17 Gram(s) Oral two times a day  senna 2 Tablet(s) Oral at bedtime    Other:   enoxaparin Injectable 40 milliGRAM(s) SubCutaneous <User Schedule>  sodium chloride 0.9%. 1000 milliLiter(s) IV Continuous <Continuous>      DIET: regular

## 2018-07-09 NOTE — PROGRESS NOTE ADULT - PROBLEM SELECTOR PLAN 2
H/H so far stable  Continue with IVF  Minimize opioid/benzo use. miralax increase  c/w colace  change senna to standing as patient has not received despite constipation

## 2018-07-09 NOTE — PROGRESS NOTE ADULT - PROBLEM SELECTOR PLAN 3
Postop management per neurosurgery  Bowel regimen  Pain control  Incentive spirometry  PT. H/H so far stable  Continue with IVF  Minimize opioid/benzo use.

## 2018-07-09 NOTE — PROGRESS NOTE ADULT - SUBJECTIVE AND OBJECTIVE BOX
Patient is a 53y old  Female who presents with a chief complaint of Lower Back Pain (2018 11:59)        SUBJECTIVE / OVERNIGHT EVENTS: no acute complaints      MEDICATIONS  (STANDING):  docusate sodium 100 milliGRAM(s) Oral three times a day  enoxaparin Injectable 40 milliGRAM(s) SubCutaneous <User Schedule>  polyethylene glycol 3350 17 Gram(s) Oral two times a day  sodium chloride 0.9%. 1000 milliLiter(s) (90 mL/Hr) IV Continuous <Continuous>    MEDICATIONS  (PRN):  acetaminophen   Tablet 650 milliGRAM(s) Oral every 6 hours PRN For Temp greater than 38 C (100.4 F)  acetaminophen   Tablet. 650 milliGRAM(s) Oral every 6 hours PRN Mild Pain (1 - 3)  diazepam    Tablet 5 milliGRAM(s) Oral every 8 hours PRN muscle spasms  HYDROmorphone  Injectable 0.5 milliGRAM(s) IV Push every 4 hours PRN Breakthrough pain  ondansetron Injectable 4 milliGRAM(s) IV Push every 6 hours PRN Nausea and/or Vomiting  oxyCODONE    5 mG/acetaminophen 325 mG 1 Tablet(s) Oral every 4 hours PRN Moderate Pain (4 - 6)  oxyCODONE    5 mG/acetaminophen 325 mG 2 Tablet(s) Oral every 4 hours PRN Severe Pain (7 - 10)  senna 2 Tablet(s) Oral at bedtime PRN Constipation      Vital Signs Last 24 Hrs  T(C): 36.9 (2018 08:22), Max: 39 (2018 15:58)  T(F): 98.5 (2018 08:22), Max: 102.2 (2018 15:58)  HR: 86 (2018 08:22) (86 - 97)  BP: 118/71 (2018 08:22) (105/67 - 130/70)  BP(mean): --  RR: 16 (2018 08:22) (16 - 18)  SpO2: 96% (2018 08:22) (96% - 100%)  CAPILLARY BLOOD GLUCOSE        I&O's Summary    2018 07:01  -  2018 07:00  --------------------------------------------------------  IN: 3050 mL / OUT: 3800 mL / NET: -750 mL    2018 07:01  -  2018 10:39  --------------------------------------------------------  IN: 0 mL / OUT: 400 mL / NET: -400 mL        PHYSICAL EXAM:  GENERAL: NAD  HEENT: neck supple  LUNG: Clear to auscultation bilaterally; No wheeze  HEART: Regular rate and rhythm  ABDOMEN: Soft, Nontender, Nondistended; Bowel sounds present  EXTREMITIES: No leg edema  PSYCH: normal affect, calm  NEUROLOGY: AAO x3, moves all extremities  SKIN: + staples in lower back C/D/I    LABS:                        11.8   6.4   )-----------( 201      ( 2018 08:37 )             34.3     -    138  |  104  |  6<L>  ----------------------------<  120<H>  3.7   |  22  |  0.51    Ca    8.3<L>      2018 08:37            Urinalysis Basic - ( 2018 17:43 )    Color: PL Yellow / Appearance: Clear / S.008 / pH: x  Gluc: x / Ketone: Trace  / Bili: Negative / Urobili: Negative   Blood: x / Protein: Negative / Nitrite: Negative   Leuk Esterase: Moderate / RBC: 5-10 /HPF / WBC 25-50 /HPF   Sq Epi: x / Non Sq Epi: Moderate /HPF / Bacteria: Few /HPF        RADIOLOGY & ADDITIONAL TESTS:    Imaging Personally Reviewed: LE doppler reviewed - No evidence of bilateral lower extremity deep venous thrombosis.  Consultant(s) Notes Reviewed:    Care Discussed with Consultants/Other Providers: d/w Neurosurgery SHANEL Dumont regarding plan of care Patient is a 53y old  Female who presents with a chief complaint of Lower Back Pain (2018 11:59)        SUBJECTIVE / OVERNIGHT EVENTS: no acute complaints, no sore throat, cough, mild abd discomfort no diarrhea, nausea or vomiting. no BM x 5 days. +flatus      MEDICATIONS  (STANDING):  docusate sodium 100 milliGRAM(s) Oral three times a day  enoxaparin Injectable 40 milliGRAM(s) SubCutaneous <User Schedule>  polyethylene glycol 3350 17 Gram(s) Oral two times a day  sodium chloride 0.9%. 1000 milliLiter(s) (90 mL/Hr) IV Continuous <Continuous>    MEDICATIONS  (PRN):  acetaminophen   Tablet 650 milliGRAM(s) Oral every 6 hours PRN For Temp greater than 38 C (100.4 F)  acetaminophen   Tablet. 650 milliGRAM(s) Oral every 6 hours PRN Mild Pain (1 - 3)  diazepam    Tablet 5 milliGRAM(s) Oral every 8 hours PRN muscle spasms  HYDROmorphone  Injectable 0.5 milliGRAM(s) IV Push every 4 hours PRN Breakthrough pain  ondansetron Injectable 4 milliGRAM(s) IV Push every 6 hours PRN Nausea and/or Vomiting  oxyCODONE    5 mG/acetaminophen 325 mG 1 Tablet(s) Oral every 4 hours PRN Moderate Pain (4 - 6)  oxyCODONE    5 mG/acetaminophen 325 mG 2 Tablet(s) Oral every 4 hours PRN Severe Pain (7 - 10)  senna 2 Tablet(s) Oral at bedtime PRN Constipation      Vital Signs Last 24 Hrs  T(C): 36.9 (2018 08:22), Max: 39 (2018 15:58)  T(F): 98.5 (2018 08:22), Max: 102.2 (2018 15:58)  HR: 86 (2018 08:22) (86 - 97)  BP: 118/71 (2018 08:22) (105/67 - 130/70)  BP(mean): --  RR: 16 (2018 08:22) (16 - 18)  SpO2: 96% (2018 08:22) (96% - 100%)  CAPILLARY BLOOD GLUCOSE        I&O's Summary    2018 07:01  -  2018 07:00  --------------------------------------------------------  IN: 3050 mL / OUT: 3800 mL / NET: -750 mL    2018 07:01  -  2018 10:39  --------------------------------------------------------  IN: 0 mL / OUT: 400 mL / NET: -400 mL        PHYSICAL EXAM:  GENERAL: NAD  HEENT: neck supple  LUNG: Clear to auscultation bilaterally; No wheeze  HEART: Regular rate and rhythm  ABDOMEN: Soft, Nontender, Nondistended; Bowel sounds present  EXTREMITIES: No leg edema  PSYCH: normal affect, calm  NEUROLOGY: AAO x3, moves all extremities  SKIN: + staples in lower back C/D/I    LABS:                        11.8   6.4   )-----------( 201      ( 2018 08:37 )             34.3     07-09    138  |  104  |  6<L>  ----------------------------<  120<H>  3.7   |  22  |  0.51    Ca    8.3<L>      2018 08:37            Urinalysis Basic - ( 2018 17:43 )    Color: PL Yellow / Appearance: Clear / S.008 / pH: x  Gluc: x / Ketone: Trace  / Bili: Negative / Urobili: Negative   Blood: x / Protein: Negative / Nitrite: Negative   Leuk Esterase: Moderate / RBC: 5-10 /HPF / WBC 25-50 /HPF   Sq Epi: x / Non Sq Epi: Moderate /HPF / Bacteria: Few /HPF        RADIOLOGY & ADDITIONAL TESTS:    Imaging Personally Reviewed: JENNIFER doppler reviewed - No evidence of bilateral lower extremity deep venous thrombosis.  Consultant(s) Notes Reviewed:    Care Discussed with Consultants/Other Providers: d/w Neurosurgery SHANEL Dumont regarding plan of care

## 2018-07-09 NOTE — CONSULT NOTE ADULT - ASSESSMENT
Orthostatic hypotension  improving  monitor orthostatic  increase po intake and salt intake  if no improvement then will give IVF  PT  may place compression stockings    s/p spine surgery  pain control  PT  DVT prophylaxis
53F with hx of appendicitis s/p appendectomy c/b pelvic abscess (+ ecoli) requiring drainage and course of abx, ? postop ileus(3-4/2018), progressively worsening LBP with RLE radiculopathy and numbness admitted s/p L5-S1 metrex PLIF on 7/6. Postop course c/b orthostatic hypotension and fever to 102 on POD#2.

## 2018-07-10 VITALS
TEMPERATURE: 98 F | HEART RATE: 83 BPM | RESPIRATION RATE: 18 BRPM | OXYGEN SATURATION: 100 % | SYSTOLIC BLOOD PRESSURE: 112 MMHG | DIASTOLIC BLOOD PRESSURE: 74 MMHG

## 2018-07-10 PROCEDURE — 97161 PT EVAL LOW COMPLEX 20 MIN: CPT

## 2018-07-10 PROCEDURE — 85027 COMPLETE CBC AUTOMATED: CPT

## 2018-07-10 PROCEDURE — 87086 URINE CULTURE/COLONY COUNT: CPT

## 2018-07-10 PROCEDURE — 80053 COMPREHEN METABOLIC PANEL: CPT

## 2018-07-10 PROCEDURE — 72100 X-RAY EXAM L-S SPINE 2/3 VWS: CPT

## 2018-07-10 PROCEDURE — 80048 BASIC METABOLIC PNL TOTAL CA: CPT

## 2018-07-10 PROCEDURE — 71045 X-RAY EXAM CHEST 1 VIEW: CPT

## 2018-07-10 PROCEDURE — 99233 SBSQ HOSP IP/OBS HIGH 50: CPT

## 2018-07-10 PROCEDURE — 81025 URINE PREGNANCY TEST: CPT

## 2018-07-10 PROCEDURE — 93970 EXTREMITY STUDY: CPT

## 2018-07-10 PROCEDURE — 87186 SC STD MICRODIL/AGAR DIL: CPT

## 2018-07-10 PROCEDURE — 97116 GAIT TRAINING THERAPY: CPT

## 2018-07-10 PROCEDURE — 81001 URINALYSIS AUTO W/SCOPE: CPT

## 2018-07-10 PROCEDURE — C1713: CPT

## 2018-07-10 PROCEDURE — 76000 FLUOROSCOPY <1 HR PHYS/QHP: CPT

## 2018-07-10 PROCEDURE — 87040 BLOOD CULTURE FOR BACTERIA: CPT

## 2018-07-10 PROCEDURE — 72131 CT LUMBAR SPINE W/O DYE: CPT

## 2018-07-10 PROCEDURE — C1769: CPT

## 2018-07-10 PROCEDURE — 97530 THERAPEUTIC ACTIVITIES: CPT

## 2018-07-10 PROCEDURE — C1889: CPT

## 2018-07-10 PROCEDURE — 82962 GLUCOSE BLOOD TEST: CPT

## 2018-07-10 PROCEDURE — 97110 THERAPEUTIC EXERCISES: CPT

## 2018-07-10 RX ORDER — AMOXICILLIN 250 MG/5ML
1 SUSPENSION, RECONSTITUTED, ORAL (ML) ORAL
Qty: 9 | Refills: 0
Start: 2018-07-10 | End: 2018-07-12

## 2018-07-10 RX ORDER — POLYETHYLENE GLYCOL 3350 17 G/17G
17 POWDER, FOR SOLUTION ORAL
Qty: 0 | Refills: 0 | DISCHARGE
Start: 2018-07-10

## 2018-07-10 RX ORDER — SENNA PLUS 8.6 MG/1
2 TABLET ORAL
Qty: 0 | Refills: 0 | DISCHARGE
Start: 2018-07-10

## 2018-07-10 RX ORDER — DIAZEPAM 5 MG
1 TABLET ORAL
Qty: 15 | Refills: 0
Start: 2018-07-10 | End: 2018-07-14

## 2018-07-10 RX ORDER — OXYCODONE AND ACETAMINOPHEN 5; 325 MG/1; MG/1
1 TABLET ORAL
Qty: 42 | Refills: 0
Start: 2018-07-10 | End: 2018-07-16

## 2018-07-10 RX ADMIN — Medication 650 MILLIGRAM(S): at 14:37

## 2018-07-10 RX ADMIN — Medication 650 MILLIGRAM(S): at 15:01

## 2018-07-10 NOTE — PROGRESS NOTE ADULT - PROBLEM SELECTOR PLAN 3
H/H so far stable  Continue with IVF  Minimize opioid/benzo use. resolved s/p IVF  H/H stable  Minimize opioid/benzo use.

## 2018-07-10 NOTE — PROGRESS NOTE ADULT - PROBLEM SELECTOR PLAN 2
miralax increase  c/w colace  change senna to standing as patient has not received despite constipation c/w bowel regimen

## 2018-07-10 NOTE — PROGRESS NOTE ADULT - ASSESSMENT
HPI: 52 y/o F PMH appendicitis, S/P appendectomy 4/2018 at [facility], c/b E Coli+ pelvic abscess that was drained and ileus post-op with NGT placement, was admitted for 20 days, presents today with c/o low back pain for several years that radiates into the right lower leg, S/P epidural injections, last one a year ago without relief.  Scheduled for L5-S12 Metrx posterior lumbar fusion.    PROCEDURE: 7/6 s/p L5-S1 posterior lumbar interbody fusion, POD#4    PLAN:  -Percocet PRN for pain control, valium PRN for muscle spasm  -Cardiology consult appreciated. Orthostasis improved.   -Awaiting blood cultures and urine culture. UA mildly positive but patient asymptomatic. Monitor for signs/symptoms of infection; consider treating w/ antibiotics if persistently febrile.   -Encouraged mobilization  -Encouraged incentive spirometer and instructed patient on proper use  -DVT prophylaxis: SQL, venodynes  -Dispo: outpatient PT  -Possible discharge home today  -[id number]

## 2018-07-10 NOTE — PROGRESS NOTE ADULT - SUBJECTIVE AND OBJECTIVE BOX
SUBJECTIVE: Patient seen and examined at bedside. Pain well controlled this morning. RLE radiculopathy improved post operatively. Orthostasis improved; ambulated hallways yesterday. Denies chest pain, shortness of breath, nausea/vomiting.    Vital Signs Last 24 Hrs  T(C): 37.2 (07-10-18 @ 08:25), Max: 37.6 (07-10-18 @ 00:18)  T(F): 98.9 (07-10-18 @ 08:25), Max: 99.6 (07-10-18 @ 00:18)  HR: 86 (07-10-18 @ 08:25) (80 - 89)  BP: 115/71 (07-10-18 @ 08:25) (94/57 - 115/71)  RR: 18 (07-10-18 @ 08:25) (18 - 18)  SpO2: 97% (07-10-18 @ 08:25) (96% - 99%)    PHYSICAL EXAM:    Constitutional: No Acute Distress, resting comfortably in bed    Neurological: Awake, alert, oriented to person, place and time, speech clear and fluent, face equal, tongue midline, briskly following commands, no drift, moves all extremities with 5/5 strength, sensation intact to light touch throughout, pupils 4mm and reactive bilaterally, extraocular movements intact, no nystagmus    Incision: +staples C/D/I    Pulmonary: Clear to Auscultation, No rales, No rhonchi, No wheezes     Cardiovascular: S1, S2, Regular rate and rhythm     Gastrointestinal: Soft, Non-tender, Non-distended, +bowel sounds x 4    Extremities: No calf tenderness bilaterally, no cyanosis, clubbing or edema      LABS:                          11.8   6.4   )-----------( 201      ( 2018 08:37 )             34.3   07-    138  |  104  |  6<L>  ----------------------------<  120<H>  3.7   |  22  |  0.51    Ca    8.3<L>      2018 08:37          IMAGIN/9 Standing xrays done; f/u official report      MEDICATIONS:  Antibiotics:    Neuro:  acetaminophen   Tablet 650 milliGRAM(s) Oral every 6 hours PRN For Temp greater than 38 C (100.4 F)  acetaminophen   Tablet. 650 milliGRAM(s) Oral every 6 hours PRN Mild Pain (1 - 3)  diazepam    Tablet 5 milliGRAM(s) Oral every 8 hours PRN muscle spasms  HYDROmorphone  Injectable 0.5 milliGRAM(s) IV Push every 4 hours PRN Breakthrough pain  ondansetron Injectable 4 milliGRAM(s) IV Push every 6 hours PRN Nausea and/or Vomiting  oxyCODONE    5 mG/acetaminophen 325 mG 1 Tablet(s) Oral every 4 hours PRN Moderate Pain (4 - 6)  oxyCODONE    5 mG/acetaminophen 325 mG 2 Tablet(s) Oral every 4 hours PRN Severe Pain (7 - 10)    Cardiac:    Pulm:    GI/:  docusate sodium 100 milliGRAM(s) Oral three times a day  polyethylene glycol 3350 17 Gram(s) Oral two times a day  senna 2 Tablet(s) Oral at bedtime    Other:   enoxaparin Injectable 40 milliGRAM(s) SubCutaneous <User Schedule>  sodium chloride 0.9%. 1000 milliLiter(s) IV Continuous <Continuous>      DIET: regular

## 2018-07-10 NOTE — CONSULT NOTE ADULT - SUBJECTIVE AND OBJECTIVE BOX
CHIEF COMPLAINT:Patient is a 53y old  Female who presents with a chief complaint of Admitted 7/6 for L5-S1 Posterior lumbar interbody fusion (07 Jul 2018 11:59)      HISTORY OF PRESENT ILLNESS:  53F with hx of appendicitis s/p appendectomy c/b pelvic abscess (+ ecoli) requiring drainage and course of abx, ? postop ileus(3-4/2018), progressively worsening LBP with RLE radiculopathy and numbness admitted s/p L5-S1 metrex PLIF on 7/6. Postop course c/b orthostatic hypotension and fever to 102   now resting  feels better   no cp or sob  no dizziness or syncope       PAST MEDICAL & SURGICAL HISTORY:  Ileus following gastrointestinal surgery  Pelvic abscess in female  Appendicitis  Lumbar radiculopathy  Herniated lumbar intervertebral disc  S/P appendectomy: and drainage of pelvic abscess 4/2018          MEDICATIONS:  enoxaparin Injectable 40 milliGRAM(s) SubCutaneous <User Schedule>        acetaminophen   Tablet 650 milliGRAM(s) Oral every 6 hours PRN  acetaminophen   Tablet. 650 milliGRAM(s) Oral every 6 hours PRN  diazepam    Tablet 5 milliGRAM(s) Oral every 8 hours PRN  HYDROmorphone  Injectable 0.5 milliGRAM(s) IV Push every 4 hours PRN  ondansetron Injectable 4 milliGRAM(s) IV Push every 6 hours PRN  oxyCODONE    5 mG/acetaminophen 325 mG 1 Tablet(s) Oral every 4 hours PRN  oxyCODONE    5 mG/acetaminophen 325 mG 2 Tablet(s) Oral every 4 hours PRN    docusate sodium 100 milliGRAM(s) Oral three times a day  polyethylene glycol 3350 17 Gram(s) Oral two times a day  senna 2 Tablet(s) Oral at bedtime          FAMILY HISTORY:      Non-contributory    SOCIAL HISTORY:    No tobacco, drugs or etoh    Allergies    azithromycin (Hives)  dogs, cats, perfumes (Short breath; Eye Irritation)  Levaquin (Hives)  metals (Rash)  shellfish (Anaphylaxis)    Intolerances    	    REVIEW OF SYSTEMS:  as above  The rest of the 14 points ROS reviewed and except above they are unremarkable.        PHYSICAL EXAM:  T(C): 36.9 (07-09-18 @ 16:31), Max: 37.8 (07-08-18 @ 20:20)  HR: 89 (07-09-18 @ 16:31) (82 - 97)  BP: 111/65 (07-09-18 @ 16:31) (109/66 - 130/70)  RR: 18 (07-09-18 @ 16:31) (16 - 18)  SpO2: 96% (07-09-18 @ 16:31) (96% - 100%)  Wt(kg): --  I&O's Summary    08 Jul 2018 07:01  -  09 Jul 2018 07:00  --------------------------------------------------------  IN: 3050 mL / OUT: 3800 mL / NET: -750 mL    09 Jul 2018 07:01  -  09 Jul 2018 19:12  --------------------------------------------------------  IN: 2920 mL / OUT: 750 mL / NET: 2170 mL      JVP: Normal  Neck: supple  Lung: clear   CV: S1 S2 , Murmur:  Abd: soft  Ext: No edema  neuro: Awake / alert  Psych: flat affect  Skin: normal     LABS/DATA:    TELEMETRY: 	    ECG:  	   	  CARDIAC MARKERS:                                      11.8   6.4   )-----------( 201      ( 09 Jul 2018 08:37 )             34.3     07-09    138  |  104  |  6<L>  ----------------------------<  120<H>  3.7   |  22  |  0.51    Ca    8.3<L>      09 Jul 2018 08:37      proBNP:   Lipid Profile:   HgA1c:   TSH:
HPI:   Patient is a 53y female who underwent elective spine fusion for disc disease on . On  she had fever to 102 at night and has been afebrile since. She had a klein that was placed. on  then pulled on . Her urine culture is growing enterococcus and her UA has some wbc but also has some sq epi. She was initially assymptomatic but today she reports dysuria at the end of the stream. She empties her bladder well, no frequency or urgency or flank pain.   She had AP with pelvic abscess in March that was drained and stayed 3 weeks in hospital but is fine now from that perspective  REVIEW OF SYSTEMS:  All other review of systems negative (Comprehensive ROS)    PAST MEDICAL & SURGICAL HISTORY:  Ileus following gastrointestinal surgery  Pelvic abscess in female  Appendicitis  Lumbar radiculopathy  Herniated lumbar intervertebral disc  S/P appendectomy: and drainage of pelvic abscess 2018      Allergies    azithromycin (Hives)  dogs, cats, perfumes (Short breath; Eye Irritation)  Levaquin (Hives)  metals (Rash)  shellfish (Anaphylaxis)    Intolerances        Antimicrobials Day #  :    Other Medications:  acetaminophen   Tablet 650 milliGRAM(s) Oral every 6 hours PRN  acetaminophen   Tablet. 650 milliGRAM(s) Oral every 6 hours PRN  diazepam    Tablet 5 milliGRAM(s) Oral every 8 hours PRN  docusate sodium 100 milliGRAM(s) Oral three times a day  enoxaparin Injectable 40 milliGRAM(s) SubCutaneous <User Schedule>  HYDROmorphone  Injectable 0.5 milliGRAM(s) IV Push every 4 hours PRN  ondansetron Injectable 4 milliGRAM(s) IV Push every 6 hours PRN  oxyCODONE    5 mG/acetaminophen 325 mG 1 Tablet(s) Oral every 4 hours PRN  oxyCODONE    5 mG/acetaminophen 325 mG 2 Tablet(s) Oral every 4 hours PRN  polyethylene glycol 3350 17 Gram(s) Oral two times a day  senna 2 Tablet(s) Oral at bedtime      FAMILY HISTORY:      SOCIAL HISTORY:  Smoking: [ ]Yes [x ]No  ETOH: [ ]Yes [x ]No  Drug Use: [ ]Yes [ x]No   [ ] Single[ x]    T(F): 98.9 (07-10-18 @ 08:25), Max: 99.6 (07-10-18 @ 00:18)  HR: 86 (07-10-18 @ 08:25)  BP: 115/71 (07-10-18 @ 08:25)  RR: 18 (07-10-18 @ 08:25)  SpO2: 97% (07-10-18 @ 08:25)  Wt(kg): --    PHYSICAL EXAM:  General: alert, no acute distress  Eyes:  anicteric, no conjunctival injection, no discharge  Oropharynx: no lesions or injection 	  Neck: supple, without adenopathy  Lungs: clear to auscultation  Heart: regular rate and rhythm; no murmur, rubs or gallops  Abdomen: soft, nondistended, nontender, without mass or organomegaly  Skin: blister on back at tape line  Extremities: no clubbing, cyanosis, or edema  Neurologic: alert, oriented, moves all extremities  no flank tenderness  wounds clean and intact  LAB RESULTS:                        11.8   6.4   )-----------( 201      ( 2018 08:37 )             34.3         138  |  104  |  6<L>  ----------------------------<  120<H>  3.7   |  22  |  0.51    Ca    8.3<L>      2018 08:37        Urinalysis Basic - ( 2018 17:43 )    Color: PL Yellow / Appearance: Clear / S.008 / pH: x  Gluc: x / Ketone: Trace  / Bili: Negative / Urobili: Negative   Blood: x / Protein: Negative / Nitrite: Negative   Leuk Esterase: Moderate / RBC: 5-10 /HPF / WBC 25-50 /HPF   Sq Epi: x / Non Sq Epi: Moderate /HPF / Bacteria: Few /HPF        MICROBIOLOGY:  RECENT CULTURES:   @ 21:57 .Blood Blood     No growth to date.       @ 20:47 .Urine Clean Catch (Midstream)     >100,000 CFU/ml Enterococcus faecalis  Normal Urogenital argenis present            RADIOLOGY REVIEWED:  < from: [facility] Duplex Lower Ext Vein Scan, Bilat (18 @ 21:25) >  IMPRESSION:     No evidence of bilateral lower extremity deep venous thrombosis.      < from: Xray Lumbar Spine AP + Lateral (18 @ 15:53) >    IMPRESSION:     Status post L5-S1 posterior spinal fusion with an L5-S1 disc spacer and   recent postoperative changes.              < end of copied text >    < end of copied text >  < from: CT Lumbar Spine No Cont (18 @ 16:36) >    IMPRESSION:  Status post L5-S1 posterior fixation hardware placement as well as   placement of an L5-S1 intervertebral disc spacer with expected   postoperative changes.              < end of copied text >          Impression:  Patient s/p recent AP with abscess in MArch now resolved, s/p elective spine fusion and disc spacer with transient post op fever but now appears to have cystitis. She grew e. faecalis from urine so 3 days of amoxicillin is fine    Recommendations:  Amoxicillin 500mg po tid for 3 days  take probiotic and kefir
CC: fever, orthostatic hypotension    HPI: 53F with hx of appendicitis s/p appendectomy c/b pelvic abscess (+ ecoli) requiring drainage and course of abx, ? postop ileus(3-2018), progressively worsening LBP with RLE radiculopathy and numbness admitted s/p L5-S1 metrex PLIF on . Postop course c/b orthostatic hypotension and fever to 102 on POD#2. Patient endorses back pain and feeling dizzy with standing but otherwise denies any cough, SOB, chest pain, nausea, ab pain, dysuria, diarrhea. No BM since surgery. Patient had klein in which was removed today.      PMD: Dr. Ahmet Nettles    PAST MEDICAL & SURGICAL HISTORY:  Ileus following gastrointestinal surgery  Pelvic abscess in female  Appendicitis  Lumbar radiculopathy  Herniated lumbar intervertebral disc  S/P appendectomy: and drainage of pelvic abscess 2018      Review of Systems:   CONSTITUTIONAL: + fever, no weight loss, or fatigue  HEENT: No sore throat, vision changes  RESPIRATORY: No cough, wheezing, shortness of breath  CARDIOVASCULAR: No chest pain, palpitations, leg edema  GASTROINTESTINAL: No abdominal pain. No nausea, vomiting, diarrhea. No melena or hematochezia.  GENITOURINARY: No dysuria, frequency, hematuria  NEUROLOGICAL: No headaches, memory loss, loss of strength, numbness, or tremors + dizziness with standing  SKIN: No itching, burning, rashes, or lesions   MUSCULOSKELETAL: + back pain  PSYCHIATRIC: No depression, anxiety  HEME/LYMPH: No easy bruising, or bleeding gums      Allergies    azithromycin (Hives)  dogs, cats, perfumes (Short breath; Eye Irritation)  Levaquin (Hives)  metals (Rash)  shellfish (Anaphylaxis)    Intolerances        Social History:   No tobacco use  Social EtOh    FAMILY HISTORY:  Noncontributory    HOME MEDS:  antacids  anti-inflammatory med for back pain    MEDICATIONS  (STANDING):  docusate sodium 100 milliGRAM(s) Oral three times a day  enoxaparin Injectable 40 milliGRAM(s) SubCutaneous <User Schedule>  polyethylene glycol 3350 17 Gram(s) Oral two times a day  sodium chloride 0.9%. 1000 milliLiter(s) (90 mL/Hr) IV Continuous <Continuous>    MEDICATIONS  (PRN):  acetaminophen   Tablet 650 milliGRAM(s) Oral every 6 hours PRN For Temp greater than 38 C (100.4 F)  acetaminophen   Tablet. 650 milliGRAM(s) Oral every 6 hours PRN Mild Pain (1 - 3)  diazepam    Tablet 5 milliGRAM(s) Oral every 8 hours PRN muscle spasms  HYDROmorphone  Injectable 0.5 milliGRAM(s) IV Push every 4 hours PRN Breakthrough pain  ondansetron Injectable 4 milliGRAM(s) IV Push every 6 hours PRN Nausea and/or Vomiting  oxyCODONE    5 mG/acetaminophen 325 mG 1 Tablet(s) Oral every 4 hours PRN Moderate Pain (4 - 6)  oxyCODONE    5 mG/acetaminophen 325 mG 2 Tablet(s) Oral every 4 hours PRN Severe Pain (7 - 10)  senna 2 Tablet(s) Oral at bedtime PRN Constipation      Vital Signs Last 24 Hrs  T(C): 39 (2018 15:58), Max: 39 (2018 15:58)  T(F): 102.2 (2018 15:58), Max: 102.2 (2018 15:58)  HR: 96 (2018 15:58) (86 - 100)  BP: 105/67 (2018 15:58) (100/68 - 115/69)  BP(mean): --  RR: 18 (2018 15:58) (18 - 18)  SpO2: 98% (2018 15:58) (97% - 100%)  CAPILLARY BLOOD GLUCOSE        I&O's Summary    2018 07:  -  2018 07:00  --------------------------------------------------------  IN: 640 mL / OUT: 4100 mL / NET: -3460 mL    2018 07:01  -  2018 17:54  --------------------------------------------------------  IN: 1120 mL / OUT: 2150 mL / NET: -1030 mL        PHYSICAL EXAM:  GENERAL: NAD  HEENT: neck supple  LUNG: Clear to auscultation bilaterally; No wheeze  HEART: Regular rate and rhythm  ABDOMEN: Soft, Nontender, Nondistended; Bowel sounds present  EXTREMITIES: No leg edema  PSYCH: normal affect, calm  NEUROLOGY: AAO x3, moves all extremities  SKIN: + staples in lower back C/D/I    LABS:                        12.0   9.45  )-----------( 201      ( 2018 07:12 )             36.4     07-07    140  |  106  |  9   ----------------------------<  98  4.5   |  24  |  0.60    Ca    8.4      2018 08:15            Urinalysis Basic - ( 2018 17:43 )    Color: PL Yellow / Appearance: Clear / S.008 / pH: x  Gluc: x / Ketone: Trace  / Bili: Negative / Urobili: Negative   Blood: x / Protein: Negative / Nitrite: Negative   Leuk Esterase: Moderate / RBC: 5-10 /HPF / WBC 25-50 /HPF   Sq Epi: x / Non Sq Epi: Moderate /HPF / Bacteria: Few /HPF        RADIOLOGY & ADDITIONAL TESTS:    Imaging Personally Reviewed:    < from: CT Lumbar Spine No Cont (18 @ 16:36) >  IMPRESSION:  Status post L5-S1 posterior fixation hardware placement as well as   placement of an L5-S1 intervertebral disc spacer with expected   postoperative changes.    < end of copied text >      CXR reviewed by me: grossly appears clear    Consultant(s) Notes Reviewed:      Care Discussed with Consultants/Other Providers: neurosurgery

## 2018-07-10 NOTE — PROGRESS NOTE ADULT - ATTENDING COMMENTS
[facility]. [facility]  [id number] d/c planning  Dr. POND [facility] Hospitalist  [id number]

## 2018-07-10 NOTE — PROGRESS NOTE ADULT - PROBLEM SELECTOR PLAN 1
+ fever on POD #2, no further fevers - will continue to montior   No leukocytosis and no localizing symptom/sign of infection at this time.   UA mildly + but also with moderate epithelial cells and had klein in for 2 days. Patient with no urinary symptoms at this time.   f/u urine culture - if single isolate, would treat.   If continues to spike fever, can empirically start IV ceftriaxone after urine culture sent and consider ID consult.   Follow up blood cx and CXR result (on my review, appears grossly clear)  LE duplex neg  Encouraged incentive spirometry.
+ fever on POD #2, no further fevers - will continue to montior   No leukocytosis and no localizing symptom/sign of infection at this time.   UA mildly + but also with moderate epithelial cells and had klein in for 2 days. Patient with no urinary symptoms at this time.   urine culture - single isolate, consider fosfomycin x 1 dose, ID consult.   blood cx neg and CXR result (on my review, appears grossly clear)  LE duplex neg  Encouraged incentive spirometry.

## 2018-07-10 NOTE — PROGRESS NOTE ADULT - PROBLEM SELECTOR PLAN 4
Postop management per neurosurgery  Bowel regimen  Pain control  Incentive spirometry  PT. Postop management per neurosurgery  Bowel regimen  Pain control  Incentive spirometry  PT-outpt PT

## 2018-07-10 NOTE — PROGRESS NOTE ADULT - SUBJECTIVE AND OBJECTIVE BOX
Subjective: Patient seen and examined. No new events except as noted.     SUBJECTIVE/ROS:  No chest pain, dyspnea, palpitation, or dizziness.       MEDICATIONS:  MEDICATIONS  (STANDING):  docusate sodium 100 milliGRAM(s) Oral three times a day  enoxaparin Injectable 40 milliGRAM(s) SubCutaneous <User Schedule>  polyethylene glycol 3350 17 Gram(s) Oral two times a day  senna 2 Tablet(s) Oral at bedtime      PHYSICAL EXAM:  T(C): 37.2 (07-10-18 @ 08:25), Max: 37.6 (07-10-18 @ 00:18)  HR: 86 (07-10-18 @ 08:25) (80 - 89)  BP: 115/71 (07-10-18 @ 08:25) (94/57 - 115/71)  RR: 18 (07-10-18 @ 08:25) (18 - 18)  SpO2: 97% (07-10-18 @ 08:25) (96% - 99%)  Wt(kg): --  I&O's Summary    09 Jul 2018 07:01  -  10 Jul 2018 07:00  --------------------------------------------------------  IN: 3360 mL / OUT: 750 mL / NET: 2610 mL          JVP: Normal  Neck: supple  Lung: clear   CV: S1 S2 , Murmur:  Abd: soft  Ext: No edema  neuro: Awake / alert  Psych: flat affect  Skin: normal       LABS/DATA:    CARDIAC MARKERS:                                11.8   6.4   )-----------( 201      ( 09 Jul 2018 08:37 )             34.3     07-09    138  |  104  |  6<L>  ----------------------------<  120<H>  3.7   |  22  |  0.51    Ca    8.3<L>      09 Jul 2018 08:37      proBNP:   Lipid Profile:   HgA1c:   TSH:     TELE:  EKG:

## 2018-07-13 ENCOUNTER — CLINICAL ADVICE (OUTPATIENT)
Age: 53
End: 2018-07-13

## 2018-07-13 LAB
CULTURE RESULTS: SIGNIFICANT CHANGE UP
CULTURE RESULTS: SIGNIFICANT CHANGE UP
SPECIMEN SOURCE: SIGNIFICANT CHANGE UP
SPECIMEN SOURCE: SIGNIFICANT CHANGE UP

## 2018-07-16 ENCOUNTER — APPOINTMENT (OUTPATIENT)
Dept: SPINE | Facility: CLINIC | Age: 53
End: 2018-07-16
Payer: COMMERCIAL

## 2018-07-16 VITALS
HEART RATE: 86 BPM | DIASTOLIC BLOOD PRESSURE: 72 MMHG | WEIGHT: 155 LBS | BODY MASS INDEX: 22.96 KG/M2 | HEIGHT: 69 IN | SYSTOLIC BLOOD PRESSURE: 113 MMHG

## 2018-07-16 PROBLEM — M51.26 OTHER INTERVERTEBRAL DISC DISPLACEMENT, LUMBAR REGION: Chronic | Status: ACTIVE | Noted: 2017-08-15

## 2018-07-16 PROBLEM — M54.16 RADICULOPATHY, LUMBAR REGION: Chronic | Status: ACTIVE | Noted: 2017-08-15

## 2018-07-16 PROCEDURE — 99024 POSTOP FOLLOW-UP VISIT: CPT

## 2018-07-24 PROBLEM — N73.9 FEMALE PELVIC INFLAMMATORY DISEASE, UNSPECIFIED: Chronic | Status: ACTIVE | Noted: 2018-06-27

## 2018-07-24 PROBLEM — K91.30: Chronic | Status: ACTIVE | Noted: 2018-06-27

## 2018-07-24 PROBLEM — K37 UNSPECIFIED APPENDICITIS: Chronic | Status: ACTIVE | Noted: 2018-06-27

## 2018-07-26 ENCOUNTER — OUTPATIENT (OUTPATIENT)
Dept: OUTPATIENT SERVICES | Facility: HOSPITAL | Age: 53
LOS: 1 days | End: 2018-07-26
Payer: COMMERCIAL

## 2018-07-26 ENCOUNTER — APPOINTMENT (OUTPATIENT)
Dept: RADIOLOGY | Facility: CLINIC | Age: 53
End: 2018-07-26
Payer: COMMERCIAL

## 2018-07-26 DIAGNOSIS — Z90.49 ACQUIRED ABSENCE OF OTHER SPECIFIED PARTS OF DIGESTIVE TRACT: Chronic | ICD-10-CM

## 2018-07-26 DIAGNOSIS — Z98.890 OTHER SPECIFIED POSTPROCEDURAL STATES: ICD-10-CM

## 2018-07-26 PROCEDURE — 72100 X-RAY EXAM L-S SPINE 2/3 VWS: CPT

## 2018-07-26 PROCEDURE — 72100 X-RAY EXAM L-S SPINE 2/3 VWS: CPT | Mod: 26

## 2018-08-02 ENCOUNTER — APPOINTMENT (OUTPATIENT)
Dept: SPINE | Facility: CLINIC | Age: 53
End: 2018-08-02
Payer: COMMERCIAL

## 2018-08-02 VITALS
HEIGHT: 69 IN | BODY MASS INDEX: 22.96 KG/M2 | DIASTOLIC BLOOD PRESSURE: 70 MMHG | WEIGHT: 155 LBS | SYSTOLIC BLOOD PRESSURE: 116 MMHG

## 2018-08-02 PROCEDURE — 99024 POSTOP FOLLOW-UP VISIT: CPT

## 2018-08-02 RX ORDER — METHYLPREDNISOLONE 4 MG/1
4 TABLET ORAL
Qty: 1 | Refills: 0 | Status: DISCONTINUED | COMMUNITY
Start: 2018-07-24 | End: 2018-08-02

## 2018-08-28 ENCOUNTER — APPOINTMENT (OUTPATIENT)
Dept: RADIOLOGY | Facility: CLINIC | Age: 53
End: 2018-08-28
Payer: COMMERCIAL

## 2018-08-28 ENCOUNTER — OUTPATIENT (OUTPATIENT)
Dept: OUTPATIENT SERVICES | Facility: HOSPITAL | Age: 53
LOS: 1 days | End: 2018-08-28
Payer: COMMERCIAL

## 2018-08-28 DIAGNOSIS — Z90.49 ACQUIRED ABSENCE OF OTHER SPECIFIED PARTS OF DIGESTIVE TRACT: Chronic | ICD-10-CM

## 2018-08-28 DIAGNOSIS — Z00.8 ENCOUNTER FOR OTHER GENERAL EXAMINATION: ICD-10-CM

## 2018-08-28 PROCEDURE — 72100 X-RAY EXAM L-S SPINE 2/3 VWS: CPT

## 2018-08-28 PROCEDURE — 72100 X-RAY EXAM L-S SPINE 2/3 VWS: CPT | Mod: 26

## 2018-08-30 ENCOUNTER — APPOINTMENT (OUTPATIENT)
Dept: SPINE | Facility: CLINIC | Age: 53
End: 2018-08-30
Payer: COMMERCIAL

## 2018-08-30 VITALS
HEIGHT: 69 IN | DIASTOLIC BLOOD PRESSURE: 72 MMHG | BODY MASS INDEX: 22.96 KG/M2 | SYSTOLIC BLOOD PRESSURE: 120 MMHG | WEIGHT: 155 LBS

## 2018-08-30 PROCEDURE — 99024 POSTOP FOLLOW-UP VISIT: CPT

## 2018-09-11 ENCOUNTER — RX RENEWAL (OUTPATIENT)
Age: 53
End: 2018-09-11

## 2018-11-01 ENCOUNTER — APPOINTMENT (OUTPATIENT)
Dept: SPINE | Facility: CLINIC | Age: 53
End: 2018-11-01

## 2018-11-08 ENCOUNTER — APPOINTMENT (OUTPATIENT)
Dept: SPINE | Facility: CLINIC | Age: 53
End: 2018-11-08

## 2020-12-18 ENCOUNTER — NON-APPOINTMENT (OUTPATIENT)
Age: 55
End: 2020-12-18

## 2021-01-19 ENCOUNTER — APPOINTMENT (OUTPATIENT)
Dept: SPINE | Facility: CLINIC | Age: 56
End: 2021-01-19

## 2021-01-19 VITALS
HEIGHT: 69 IN | HEART RATE: 72 BPM | WEIGHT: 170 LBS | DIASTOLIC BLOOD PRESSURE: 79 MMHG | TEMPERATURE: 97.5 F | OXYGEN SATURATION: 98 % | SYSTOLIC BLOOD PRESSURE: 115 MMHG | BODY MASS INDEX: 25.18 KG/M2

## 2021-01-19 DIAGNOSIS — Z78.9 OTHER SPECIFIED HEALTH STATUS: ICD-10-CM

## 2021-01-19 DIAGNOSIS — M51.26 OTHER INTERVERTEBRAL DISC DISPLACEMENT, LUMBAR REGION: ICD-10-CM

## 2021-01-19 DIAGNOSIS — Z98.890 OTHER SPECIFIED POSTPROCEDURAL STATES: ICD-10-CM

## 2021-01-19 DIAGNOSIS — M54.16 RADICULOPATHY, LUMBAR REGION: ICD-10-CM

## 2021-01-19 RX ORDER — DICLOFENAC SODIUM AND MISOPROSTOL 50; 200 MG/1; UG/1
50-0.2 TABLET, DELAYED RELEASE ORAL 3 TIMES DAILY
Qty: 90 | Refills: 0 | Status: DISCONTINUED | COMMUNITY
Start: 2018-09-11 | End: 2021-01-19

## 2021-01-19 RX ORDER — DIAZEPAM 5 MG/1
5 TABLET ORAL EVERY 8 HOURS
Qty: 42 | Refills: 0 | Status: DISCONTINUED | COMMUNITY
Start: 2018-09-11 | End: 2021-01-19

## 2021-01-20 NOTE — HISTORY OF PRESENT ILLNESS
[FreeTextEntry1] : Back pain and right heel pain  [de-identified] : This is a 55 year old s/p PLIF on 7/6/2018 of the L5 S 1 decompressive facetectomy and laminectomy and non segmental screw rafa fixation.   She was pain free and in her usual state of health until she fell and slipped about three months ago.  The pain began directly after the fall and she has bilateral lower back pain and right heel numbness.  This pain is similar to the pain that she had prior to the surgery.  The pain is reported as 7/10 and is described as a deep ache with burning of the heel and no tingling is present.  She has increased pain upon bending and lifting.  Her pain decreases with walking and rest.  She has used Diclofenac in the past with good results.  She denies any weakness and no stool or urine dysfunction.  She is walking freely.  Her incision is clean and well healed.

## 2021-01-20 NOTE — PHYSICAL EXAM
[Limited Balance] : balance was intact [Coordination - Dysmetria Impaired Finger-to-Nose Bilateral] : not present [Plantar Reflex Right Only] : normal on the right [Plantar Reflex Left Only] : normal on the left [___] : absent on the right [___] : absent on the left [Straight-Leg Raise Test - Left] : straight leg raise of the left leg was negative

## 2021-01-20 NOTE — ASSESSMENT
[FreeTextEntry1] : 2.5 years post op PLIF L5 S 1 and recent fall wit 3 months of bilateral back pain and right heel burning.  She will undergo an MRI of the lumbar spine with flexion/extension xrays of the lower back.  Avoid lifting and bending.   Diclofenac was renewed and instructions discussed with side effects.   She will return to see Dr Kowalski in one weeks time for a formal consult.

## 2021-01-20 NOTE — SURGICAL HISTORY
[de-identified] : 7/6/2018 L 5 S 1 bilateral decompressive facetectomy and laminectomy with nonsegmental screw rafa fixation

## 2021-02-24 ENCOUNTER — OUTPATIENT (OUTPATIENT)
Dept: OUTPATIENT SERVICES | Facility: HOSPITAL | Age: 56
LOS: 1 days | End: 2021-02-24
Payer: COMMERCIAL

## 2021-02-24 ENCOUNTER — APPOINTMENT (OUTPATIENT)
Dept: MRI IMAGING | Facility: CLINIC | Age: 56
End: 2021-02-24

## 2021-02-24 ENCOUNTER — APPOINTMENT (OUTPATIENT)
Dept: RADIOLOGY | Facility: CLINIC | Age: 56
End: 2021-02-24
Payer: COMMERCIAL

## 2021-02-24 DIAGNOSIS — Z90.49 ACQUIRED ABSENCE OF OTHER SPECIFIED PARTS OF DIGESTIVE TRACT: Chronic | ICD-10-CM

## 2021-02-24 DIAGNOSIS — Z98.890 OTHER SPECIFIED POSTPROCEDURAL STATES: ICD-10-CM

## 2021-02-24 DIAGNOSIS — Z98.1 ARTHRODESIS STATUS: ICD-10-CM

## 2021-02-24 PROCEDURE — 72114 X-RAY EXAM L-S SPINE BENDING: CPT

## 2021-02-24 PROCEDURE — 72148 MRI LUMBAR SPINE W/O DYE: CPT

## 2021-02-24 PROCEDURE — 72114 X-RAY EXAM L-S SPINE BENDING: CPT | Mod: 26

## 2021-02-24 PROCEDURE — 72148 MRI LUMBAR SPINE W/O DYE: CPT | Mod: 26

## 2021-04-01 ENCOUNTER — APPOINTMENT (OUTPATIENT)
Dept: SPINE | Facility: CLINIC | Age: 56
End: 2021-04-01
Payer: COMMERCIAL

## 2021-04-01 VITALS
WEIGHT: 170 LBS | SYSTOLIC BLOOD PRESSURE: 102 MMHG | BODY MASS INDEX: 25.18 KG/M2 | HEIGHT: 69 IN | HEART RATE: 72 BPM | TEMPERATURE: 97.3 F | DIASTOLIC BLOOD PRESSURE: 71 MMHG | OXYGEN SATURATION: 97 %

## 2021-04-01 DIAGNOSIS — Z98.1 ARTHRODESIS STATUS: ICD-10-CM

## 2021-04-01 PROCEDURE — 99212 OFFICE O/P EST SF 10 MIN: CPT

## 2021-04-01 PROCEDURE — 99072 ADDL SUPL MATRL&STAF TM PHE: CPT

## 2021-04-01 NOTE — REASON FOR VISIT
[Follow-Up: _____] : a [unfilled] follow-up visit [FreeTextEntry1] : MARK DE JESUS is a 55 year old lady who underwent an L5-S1 fusion on 07/06/2018 which she recovered well from.  She slipped about 3 months ago which resulted in a jolt.  She started with pain across her back and down the right leg.  The patient also has bilateral knee pain and is receiving physical therapy.  She also exercises on her own and does squats and stairs but her heels throb afterwards.  She rates her pain 6-7 on a scale from one to 10.  The patient takes diclofenac as needed with relief.New flexion extension x-rays do not show any dynamic instability. A new MRI scan does not show any disc herniation or neural impingement.\par

## 2021-04-01 NOTE — ASSESSMENT
[FreeTextEntry1] : The images and findings were reviewed and discussed with the patient.  There is no evidence of significant foraminal or canal stenosis.  The hardware remains intact.It is recommended that she pursue a regular exercise program to include distinct amount of stretching.

## 2022-09-21 ENCOUNTER — NON-APPOINTMENT (OUTPATIENT)
Age: 57
End: 2022-09-21
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
## 2024-02-29 ENCOUNTER — EMERGENCY (EMERGENCY)
Facility: HOSPITAL | Age: 59
LOS: 0 days | Discharge: HOME | End: 2024-02-29
Attending: STUDENT IN AN ORGANIZED HEALTH CARE EDUCATION/TRAINING PROGRAM
Payer: COMMERCIAL

## 2024-02-29 VITALS
RESPIRATION RATE: 18 BRPM | DIASTOLIC BLOOD PRESSURE: 72 MMHG | TEMPERATURE: 98 F | OXYGEN SATURATION: 100 % | HEIGHT: 69 IN | WEIGHT: 175.05 LBS | SYSTOLIC BLOOD PRESSURE: 104 MMHG | HEART RATE: 72 BPM

## 2024-02-29 VITALS — TEMPERATURE: 98 F

## 2024-02-29 DIAGNOSIS — Z88.1 ALLERGY STATUS TO OTHER ANTIBIOTIC AGENTS STATUS: ICD-10-CM

## 2024-02-29 DIAGNOSIS — Z82.49 FAMILY HISTORY OF ISCHEMIC HEART DISEASE AND OTHER DISEASES OF THE CIRCULATORY SYSTEM: ICD-10-CM

## 2024-02-29 DIAGNOSIS — R07.9 CHEST PAIN, UNSPECIFIED: ICD-10-CM

## 2024-02-29 DIAGNOSIS — Z90.49 ACQUIRED ABSENCE OF OTHER SPECIFIED PARTS OF DIGESTIVE TRACT: Chronic | ICD-10-CM

## 2024-02-29 DIAGNOSIS — Z91.013 ALLERGY TO SEAFOOD: ICD-10-CM

## 2024-02-29 DIAGNOSIS — Z91.09 OTHER ALLERGY STATUS, OTHER THAN TO DRUGS AND BIOLOGICAL SUBSTANCES: ICD-10-CM

## 2024-02-29 DIAGNOSIS — E78.5 HYPERLIPIDEMIA, UNSPECIFIED: ICD-10-CM

## 2024-02-29 LAB
ALBUMIN SERPL ELPH-MCNC: 3.8 G/DL — SIGNIFICANT CHANGE UP (ref 3.3–5)
ALP SERPL-CCNC: 84 U/L — SIGNIFICANT CHANGE UP (ref 40–120)
ALT FLD-CCNC: 29 U/L — SIGNIFICANT CHANGE UP (ref 12–78)
ANION GAP SERPL CALC-SCNC: 3 MMOL/L — LOW (ref 5–17)
AST SERPL-CCNC: 22 U/L — SIGNIFICANT CHANGE UP (ref 15–37)
BASOPHILS # BLD AUTO: 0.07 K/UL — SIGNIFICANT CHANGE UP (ref 0–0.2)
BASOPHILS NFR BLD AUTO: 1.2 % — SIGNIFICANT CHANGE UP (ref 0–2)
BILIRUB SERPL-MCNC: 0.2 MG/DL — SIGNIFICANT CHANGE UP (ref 0.2–1.2)
BUN SERPL-MCNC: 14 MG/DL — SIGNIFICANT CHANGE UP (ref 7–23)
CALCIUM SERPL-MCNC: 9.3 MG/DL — SIGNIFICANT CHANGE UP (ref 8.5–10.1)
CHLORIDE SERPL-SCNC: 110 MMOL/L — HIGH (ref 96–108)
CO2 SERPL-SCNC: 29 MMOL/L — SIGNIFICANT CHANGE UP (ref 22–31)
CREAT SERPL-MCNC: 0.75 MG/DL — SIGNIFICANT CHANGE UP (ref 0.5–1.3)
D DIMER BLD IA.RAPID-MCNC: <150 NG/ML DDU — SIGNIFICANT CHANGE UP
EGFR: 93 ML/MIN/1.73M2 — SIGNIFICANT CHANGE UP
EOSINOPHIL # BLD AUTO: 0.13 K/UL — SIGNIFICANT CHANGE UP (ref 0–0.5)
EOSINOPHIL NFR BLD AUTO: 2.3 % — SIGNIFICANT CHANGE UP (ref 0–6)
GLUCOSE SERPL-MCNC: 89 MG/DL — SIGNIFICANT CHANGE UP (ref 70–99)
HCT VFR BLD CALC: 42.2 % — SIGNIFICANT CHANGE UP (ref 34.5–45)
HGB BLD-MCNC: 14 G/DL — SIGNIFICANT CHANGE UP (ref 11.5–15.5)
IMM GRANULOCYTES NFR BLD AUTO: 0.2 % — SIGNIFICANT CHANGE UP (ref 0–0.9)
LYMPHOCYTES # BLD AUTO: 2.23 K/UL — SIGNIFICANT CHANGE UP (ref 1–3.3)
LYMPHOCYTES # BLD AUTO: 38.8 % — SIGNIFICANT CHANGE UP (ref 13–44)
MCHC RBC-ENTMCNC: 30.2 PG — SIGNIFICANT CHANGE UP (ref 27–34)
MCHC RBC-ENTMCNC: 33.2 G/DL — SIGNIFICANT CHANGE UP (ref 32–36)
MCV RBC AUTO: 90.9 FL — SIGNIFICANT CHANGE UP (ref 80–100)
MONOCYTES # BLD AUTO: 0.43 K/UL — SIGNIFICANT CHANGE UP (ref 0–0.9)
MONOCYTES NFR BLD AUTO: 7.5 % — SIGNIFICANT CHANGE UP (ref 2–14)
NEUTROPHILS # BLD AUTO: 2.88 K/UL — SIGNIFICANT CHANGE UP (ref 1.8–7.4)
NEUTROPHILS NFR BLD AUTO: 50 % — SIGNIFICANT CHANGE UP (ref 43–77)
NRBC # BLD: 0 /100 WBCS — SIGNIFICANT CHANGE UP (ref 0–0)
NT-PROBNP SERPL-SCNC: 72 PG/ML — SIGNIFICANT CHANGE UP (ref 0–125)
PLATELET # BLD AUTO: 291 K/UL — SIGNIFICANT CHANGE UP (ref 150–400)
POTASSIUM SERPL-MCNC: 4.5 MMOL/L — SIGNIFICANT CHANGE UP (ref 3.5–5.3)
POTASSIUM SERPL-SCNC: 4.5 MMOL/L — SIGNIFICANT CHANGE UP (ref 3.5–5.3)
PROT SERPL-MCNC: 7.4 GM/DL — SIGNIFICANT CHANGE UP (ref 6–8.3)
RBC # BLD: 4.64 M/UL — SIGNIFICANT CHANGE UP (ref 3.8–5.2)
RBC # FLD: 12.8 % — SIGNIFICANT CHANGE UP (ref 10.3–14.5)
SODIUM SERPL-SCNC: 142 MMOL/L — SIGNIFICANT CHANGE UP (ref 135–145)
TROPONIN I, HIGH SENSITIVITY RESULT: <3 NG/L — SIGNIFICANT CHANGE UP
WBC # BLD: 5.75 K/UL — SIGNIFICANT CHANGE UP (ref 3.8–10.5)
WBC # FLD AUTO: 5.75 K/UL — SIGNIFICANT CHANGE UP (ref 3.8–10.5)

## 2024-02-29 PROCEDURE — 93010 ELECTROCARDIOGRAM REPORT: CPT

## 2024-02-29 PROCEDURE — 71045 X-RAY EXAM CHEST 1 VIEW: CPT | Mod: 26

## 2024-02-29 PROCEDURE — 99285 EMERGENCY DEPT VISIT HI MDM: CPT

## 2024-02-29 RX ORDER — LACTOBACILLUS ACIDOPHILUS 100MM CELL
2 CAPSULE ORAL
Qty: 0 | Refills: 0 | DISCHARGE

## 2024-02-29 RX ORDER — ATORVASTATIN CALCIUM 80 MG/1
1 TABLET, FILM COATED ORAL
Refills: 0 | DISCHARGE

## 2024-02-29 NOTE — ED PROVIDER NOTE - NPI NUMBER (FOR SYSADMIN USE ONLY) :
Clinically improved. Except for the recent diarrhea.   Will check free t4 and recheck her potassium  
[[id number]]

## 2024-02-29 NOTE — ED ADULT NURSE NOTE - NSFALLUNIVINTERV_ED_ALL_ED
Bed/Stretcher in lowest position, wheels locked, appropriate side rails in place/Call bell, personal items and telephone in reach/Instruct patient to call for assistance before getting out of bed/chair/stretcher/Non-slip footwear applied when patient is off stretcher/Ellery to call system/Physically safe environment - no spills, clutter or unnecessary equipment/Purposeful proactive rounding/Room/bathroom lighting operational, light cord in reach

## 2024-02-29 NOTE — ED ADULT NURSE NOTE - OBJECTIVE STATEMENT
Intermittent mid sternal and under left breast chest pain radiating to left arm. Pain started on Saturday. Denies SOB, nausea or vomiting. Patient doesn't report any triggers to pain.

## 2024-02-29 NOTE — ED PROVIDER NOTE - OBJECTIVE STATEMENT
57 y/o F w/ PMH HLD presenting to the ED c/o CP. Patient states she has had intermittent CP that occasionally radiates to her L arm and under her L breast x days. She denies trouble breathing. No fever, chills, N/V. States it feels like gas pain and gets better with antigas medicine. She went to her PMD today but was referred to the ED for further evaluation. Non smoker. No leg swelling or recent travel. Family hx of CAD in her father.

## 2024-02-29 NOTE — ED PROVIDER NOTE - CARE PROVIDER_API CALL
Ladonna Aviles  Interventional Cardiology  [address] [place] [address]  Phone: [phone]  Fax: [phone]  Follow Up Time: 1-3 Days

## 2024-02-29 NOTE — ED PROVIDER NOTE - WET READ LAUNCH FT
There is 1 Wet Read(s) to document. Alert and oriented, no focal deficits, no motor or sensory deficits.

## 2024-02-29 NOTE — ED PROVIDER NOTE - PATIENT PORTAL LINK FT
You can access the FollowMyHealth Patient Portal offered by [facility] by registering at the following website: [email or web address]. By joining [employer]’s FollowMyHealth portal, you will also be able to view your health information using other applications (apps) compatible with our system.

## 2024-02-29 NOTE — ED PROVIDER NOTE - NSFOLLOWUPINSTRUCTIONS_ED_ALL_ED_FT
Follow up with cardiology in the office.    Chest pain can be caused by many different conditions which may or may not be dangerous. Causes include heartburn, lung infections, heart attack, blood clot in lungs, skin infections, strain or damage to muscle, cartilage, or bones, etc. In addition to a history and physical examination, an electrocardiogram (ECG) or other lab tests may have been performed to determine the cause of your chest pain. Follow up with your primary care provider or with a cardiologist as instructed.     SEEK IMMEDIATE MEDICAL CARE IF YOU HAVE ANY OF THE FOLLOWING SYMPTOMS: worsening chest pain, coughing up blood, unexplained back/neck/jaw pain, severe abdominal pain, dizziness or lightheadedness, fainting, shortness of breath, sweaty or clammy skin, vomiting, or racing heart beat. These symptoms may represent a serious problem that is an emergency. Do not wait to see if the symptoms will go away. Get medical help right away. Call 911 and do not drive yourself to the hospital.

## 2024-02-29 NOTE — ED ADULT NURSE NOTE - ED STAT RN HANDOFF DETAILS
Pt received from [place] [facility] resting in bed in [facility]. Labs sent per order. No complaints at this time.

## 2024-02-29 NOTE — ED ADULT TRIAGE NOTE - CHIEF COMPLAINT QUOTE
pt c/o intermittent midsternal chest pain and left arm pain x 6 days. pt also c/o left under breast pain. pt denies sob, dizziness. hx: high cholesterol.

## 2024-02-29 NOTE — ED ADULT NURSE REASSESSMENT NOTE - NS ED NURSE REASSESS COMMENT FT1
Discharge completed for other RN.  instructions provided and verbalizes understanding of follow up care. Educational material provided. Denies any pain at this time. no acute distress noted. respirations even and unlabored. pt ambulatory with steady gait.

## 2024-02-29 NOTE — ED PROVIDER NOTE - CLINICAL SUMMARY MEDICAL DECISION MAKING FREE TEXT BOX
57 y/o F w/ PMH HLD presenting to the ED c/o CP.  Vitals stable.  She is well appearing in NAD.  EKG is NSR. No ischemic changes.  Cannot PERC out. Will send d-dimer.  Plan for labs/trop/CXR  Likely cards f/u  Heart score 3    Labs WNL  CXR is non focal  Will discharge w/ cardiology f/u

## 2024-06-28 ENCOUNTER — APPOINTMENT (OUTPATIENT)
Dept: HEART AND VASCULAR | Facility: CLINIC | Age: 59
End: 2024-06-28
Payer: COMMERCIAL

## 2024-06-28 ENCOUNTER — NON-APPOINTMENT (OUTPATIENT)
Age: 59
End: 2024-06-28

## 2024-06-28 VITALS
DIASTOLIC BLOOD PRESSURE: 71 MMHG | HEIGHT: 69 IN | HEART RATE: 77 BPM | SYSTOLIC BLOOD PRESSURE: 123 MMHG | BODY MASS INDEX: 25.92 KG/M2 | TEMPERATURE: 97.8 F | WEIGHT: 175 LBS | OXYGEN SATURATION: 98 %

## 2024-06-28 DIAGNOSIS — Z82.49 FAMILY HISTORY OF ISCHEMIC HEART DISEASE AND OTHER DISEASES OF THE CIRCULATORY SYSTEM: ICD-10-CM

## 2024-06-28 DIAGNOSIS — E78.5 HYPERLIPIDEMIA, UNSPECIFIED: ICD-10-CM

## 2024-06-28 DIAGNOSIS — Z13.6 ENCOUNTER FOR SCREENING FOR CARDIOVASCULAR DISORDERS: ICD-10-CM

## 2024-06-28 DIAGNOSIS — R07.89 OTHER CHEST PAIN: ICD-10-CM

## 2024-06-28 PROCEDURE — 99204 OFFICE O/P NEW MOD 45 MIN: CPT | Mod: 25

## 2024-06-28 PROCEDURE — 93000 ELECTROCARDIOGRAM COMPLETE: CPT

## 2024-07-01 ENCOUNTER — APPOINTMENT (OUTPATIENT)
Dept: CARDIOLOGY | Facility: CLINIC | Age: 59
End: 2024-07-01
Payer: COMMERCIAL

## 2024-07-01 PROCEDURE — 36415 COLL VENOUS BLD VENIPUNCTURE: CPT

## 2024-07-02 LAB
ALBUMIN SERPL ELPH-MCNC: 4.3 G/DL
ALP BLD-CCNC: 88 U/L
ALT SERPL-CCNC: 20 U/L
ANION GAP SERPL CALC-SCNC: 10 MMOL/L
AST SERPL-CCNC: 19 U/L
BILIRUB SERPL-MCNC: 0.2 MG/DL
BUN SERPL-MCNC: 11 MG/DL
CALCIUM SERPL-MCNC: 9.1 MG/DL
CHLORIDE SERPL-SCNC: 108 MMOL/L
CHOLEST SERPL-MCNC: 172 MG/DL
CO2 SERPL-SCNC: 23 MMOL/L
CREAT SERPL-MCNC: 0.74 MG/DL
EGFR: 94 ML/MIN/1.73M2
GLUCOSE SERPL-MCNC: 100 MG/DL
HDLC SERPL-MCNC: 61 MG/DL
LDLC SERPL CALC-MCNC: 102 MG/DL
NONHDLC SERPL-MCNC: 111 MG/DL
POTASSIUM SERPL-SCNC: 4.8 MMOL/L
PROT SERPL-MCNC: 6.7 G/DL
SODIUM SERPL-SCNC: 141 MMOL/L
TRIGL SERPL-MCNC: 42 MG/DL

## 2024-08-01 ENCOUNTER — APPOINTMENT (OUTPATIENT)
Dept: CT IMAGING | Facility: CLINIC | Age: 59
End: 2024-08-01

## 2024-08-06 ENCOUNTER — APPOINTMENT (OUTPATIENT)
Dept: CARDIOLOGY | Facility: CLINIC | Age: 59
End: 2024-08-06

## 2024-08-06 PROCEDURE — 93015 CV STRESS TEST SUPVJ I&R: CPT
